# Patient Record
Sex: FEMALE | NOT HISPANIC OR LATINO | URBAN - METROPOLITAN AREA
[De-identification: names, ages, dates, MRNs, and addresses within clinical notes are randomized per-mention and may not be internally consistent; named-entity substitution may affect disease eponyms.]

---

## 2023-10-19 ENCOUNTER — LAB REQUISITION (OUTPATIENT)
Dept: LAB | Facility: CLINIC | Age: 38
End: 2023-10-19

## 2023-10-19 DIAGNOSIS — O02.1 MISSED ABORTION: ICD-10-CM

## 2023-10-19 LAB
ABO/RH(D): NORMAL
ANTIBODY SCREEN: NEGATIVE
HCG INTACT+B SERPL-ACNC: ABNORMAL MIU/ML
SPECIMEN EXPIRATION DATE: NORMAL

## 2023-10-19 PROCEDURE — 84702 CHORIONIC GONADOTROPIN TEST: CPT | Performed by: PHYSICIAN ASSISTANT

## 2023-10-19 PROCEDURE — 86901 BLOOD TYPING SEROLOGIC RH(D): CPT | Performed by: PHYSICIAN ASSISTANT

## 2023-10-21 ENCOUNTER — HEALTH MAINTENANCE LETTER (OUTPATIENT)
Age: 38
End: 2023-10-21

## 2023-10-24 ENCOUNTER — LAB REQUISITION (OUTPATIENT)
Dept: LAB | Facility: CLINIC | Age: 38
End: 2023-10-24

## 2023-10-24 DIAGNOSIS — O02.1 MISSED ABORTION: ICD-10-CM

## 2023-10-24 LAB — TSH SERPL DL<=0.005 MIU/L-ACNC: 0.83 UIU/ML (ref 0.3–4.2)

## 2023-10-24 PROCEDURE — 84443 ASSAY THYROID STIM HORMONE: CPT | Performed by: OBSTETRICS & GYNECOLOGY

## 2025-01-17 ENCOUNTER — LAB REQUISITION (OUTPATIENT)
Dept: LAB | Facility: CLINIC | Age: 40
End: 2025-01-17
Payer: COMMERCIAL

## 2025-01-17 DIAGNOSIS — Z31.9 ENCOUNTER FOR PROCREATIVE MANAGEMENT, UNSPECIFIED: ICD-10-CM

## 2025-01-17 LAB — MIS SERPL-MCNC: 2.24 NG/ML (ref 0.15–7.5)

## 2025-01-17 PROCEDURE — 82166 ASSAY ANTI-MULLERIAN HORM: CPT | Mod: ORL | Performed by: OBSTETRICS & GYNECOLOGY

## 2025-02-28 ENCOUNTER — MEDICAL CORRESPONDENCE (OUTPATIENT)
Dept: HEALTH INFORMATION MANAGEMENT | Facility: CLINIC | Age: 40
End: 2025-02-28
Payer: COMMERCIAL

## 2025-02-28 ENCOUNTER — TRANSFERRED RECORDS (OUTPATIENT)
Dept: HEALTH INFORMATION MANAGEMENT | Facility: CLINIC | Age: 40
End: 2025-02-28
Payer: COMMERCIAL

## 2025-02-28 ENCOUNTER — LAB REQUISITION (OUTPATIENT)
Dept: LAB | Facility: CLINIC | Age: 40
End: 2025-02-28

## 2025-02-28 DIAGNOSIS — Z32.01 ENCOUNTER FOR PREGNANCY TEST, RESULT POSITIVE: ICD-10-CM

## 2025-02-28 PROCEDURE — 87086 URINE CULTURE/COLONY COUNT: CPT | Performed by: NURSE PRACTITIONER

## 2025-03-01 LAB — BACTERIA UR CULT: NO GROWTH

## 2025-03-16 ENCOUNTER — HEALTH MAINTENANCE LETTER (OUTPATIENT)
Age: 40
End: 2025-03-16

## 2025-03-18 ENCOUNTER — LAB (OUTPATIENT)
Dept: LAB | Facility: HOSPITAL | Age: 40
End: 2025-03-18
Attending: OBSTETRICS & GYNECOLOGY
Payer: COMMERCIAL

## 2025-03-18 ENCOUNTER — OFFICE VISIT (OUTPATIENT)
Dept: MATERNAL FETAL MEDICINE | Facility: HOSPITAL | Age: 40
End: 2025-03-18
Attending: OBSTETRICS & GYNECOLOGY
Payer: COMMERCIAL

## 2025-03-18 DIAGNOSIS — O09.521 SUPERVISION OF ELDERLY MULTIGRAVIDA IN FIRST TRIMESTER: Primary | ICD-10-CM

## 2025-03-18 DIAGNOSIS — O26.90 PREGNANCY RELATED CONDITION, ANTEPARTUM: ICD-10-CM

## 2025-03-18 DIAGNOSIS — O09.521 SUPERVISION OF ELDERLY MULTIGRAVIDA IN FIRST TRIMESTER: ICD-10-CM

## 2025-03-18 PROCEDURE — 96041 GENETIC COUNSELING SVC EA 30: CPT | Performed by: GENETIC COUNSELOR, MS

## 2025-03-18 PROCEDURE — 36415 COLL VENOUS BLD VENIPUNCTURE: CPT

## 2025-03-18 NOTE — PROGRESS NOTES
[facility]  Genetic Counseling Consult    Patient:  Cinthia Casillas  Preferred Name: Cinthia YOB: 1985   Date of Service:  3/18/25   MRN: [id number]    Cinthia was seen at the [facility] [place] for genetic consultation. The indication for genetic counseling is advanced maternal age and desire to discuss options for genetic screening and diagnostics. The patient was unaccompanied to this visit.     The session was conducted in English.      IMPRESSION/ PLAN   1. Cinthia has not had genetic screening in this pregnancy but elected to have screening today.     2. During today's [facility] visit, Cinthia had a blood draw for expanded non-invasive prenatal testing (also called NIPT, NIPS, or cell-free DNA) through [facility] The expanded NIPT screens for trisomy 21, 18, and 13 and select microdeletion syndromes, including 22q11.2 deletion syndrome. The patient opted to screen for sex chromosome aneuploidies, including reported fetal sex. Results are expected in 7-14 days. The patient will be called with results and if they do not answer they requested a detailed message with results on their voicemail, NOT including the predicted fetal sex information. Instead, they would like the sex information mailed to their home. Patient was informed that results, including fetal sex, will be available in [facility].    3. Since the patient chose aneuploidy screening via NIPT, quad screen is NOT recommended in the second trimester. If the patient desires screening for open neural tube defects, maternal serum AFP only is recommended, ideally between 16-18 weeks gestation.    4. Cinthia did not have an ultrasound today but will be returning for a nuchal translucency ultrasound on 04/01/2025.    5. Further recommendations include a fetal anatomy level II ultrasound with [facility].     6. We discussed all genetic testing options including screening vs diagnostic  "testing. Cinthia does want a lot of information about the pregnancy but \"within reason\" and during our discussion identified what level of information would be most helpful. Cinthia did share the likelihood of considering termination of pregnancy for significant ultrasound concerns and/or positive screening or diagnostic testing results. We discussed the timeline of termination and how that fits with the comprehensive anatomy ultrasound and option for amniocentesis. At this time, Cinthia is planning to only do diagnostic testing if there is an indication.     PREGNANCY HISTORY   /Parity:       Cinthia's pregnancy history is significant for:   10/2023: 7w6d, SAB, trisomy 13    Trisomy 13    Trisomy 13 is a chromosomal disorder caused by an extra copy of chromosome 13. Trisomy 13 is also called Patau syndrome. The most common type of trisomy 13, is caused by nondisjunction ( 47, +13) and is associated with advanced maternal age. Trisomy 13 can also be caused by an unbalanced Robertsonian translocation. Finally, trisomy 13 can be mosaic and likely caused by mitotic nondisjunction.Trisomy 13 is associated with severe, multiple congenital anomalies with variable clinical presentation. Common associated abnormalities include those of the central nervous system, craniofacial, skin and limbs, cardiac, and genitalia. Prenatal sonographic findings of the central nervous system are common. The majority of trisomy 13 fetuses die in utero. The median survival for liveborn children is seven days. 91% of affected children will die within the first year.    Trisomy 13 is rare with a prevalence of 1 in 5000. However, studies on trisomy 13 are difficult due to the rarity and ascertainment bias of those pregnancy losses or affected children that did have chromosomal studies performed. The chance of nondisjunction resulting in a trisomy pregnancy increases as a woman age. Furthermore, for women with a previous trisomic pregnancy " "the chance of a subsequent pregnancy being affected with the same or different trisomy is increased (even when chromosome results are those of the sporadic nondisjunction type). For women under the age of 35 at previous trisomy 13, the risk of the same trisomy is 7.8 times greater and for a different trisomy 1.6 times greater. For women over the age of 35 at previous trisomy 13, the risk of the same trisomy is 2.2 times greater and for a different trisomy 0.9 times greater (Feliciano Reyes et al., 2009) It is important to remember that data for trisomy 13 is far less compared to trisomy 21 recurrence numbers.      Cinthia's result report from that pregnancy is not available for our review. If an anora test was done that would have determined the diagnosis of trisomy 13 but would not have provided mechanism. We discussed the family history does not cause concern for a translocation. Therefore, I think this history is unlikely to significantly increase risks for Cinthia.   CURRENT PREGNANCY   Current Age: 39 year old   Age at Delivery: 39 year old  ALEX: 10/09/2025                              Gestational Age: 10w5d  This pregnancy is a single gestation.   This pregnancy was conceived spontaneously.    MEDICAL HISTORY   Cinthia s reported medical history is not expected to impact pregnancy management or risks to fetal development.       FAMILY HISTORY   A three-generation pedigree was obtained today and is scanned under the \"Media\" tab in Epic. The family history was reported by Cinthia.    The following significant findings were reported today:   The father of the pregnancy, PAO Kearney, is healthy  The family history is negative for other trisomy conceptions, pregnancy losses, or infertility     Otherwise, the reported family history is unremarkable for multiple miscarriages, stillbirths, birth defects, intellectual disabilities, known genetic conditions, and consanguinity.       RISK ASSESSMENT FOR INHERITED CONDITIONS AND CARRIER " SCREENING OPTIONS   Expanded carrier screening is available to screen for autosomal recessive conditions and X-linked conditions in a large list of genes. Carrier screening does not test the pregnancy but gives a risk assessment for the pregnancy and future pregnancies to have the condition. Expanded carrier screening is designed to identify carrier status for conditions that are primarily childhood or adolescent onset. Expanded carrier screening does not evaluate for adult-onset conditions such as hereditary cancer syndromes, dementia/ Alzheimer's disease, or cardiovascular disease risk factors. Additionally, expanded carrier screening is not comprehensive for all known genetic diseases or inherited conditions. Carrier screening does not test for all genetic and health conditions or risk factors.     Autosomal recessive conditions happen when a mutation has been inherited from the egg and sperm and include conditions like cystic fibrosis, thalassemia, hearing loss, spinal muscular atrophy, and more. We reviewed that when both biological parents carry a harmful genetic change in a gene associated with autosomal recessive inheritance, each of their pregnancies has a 1 in 4 (25%) chance to be affected by that condition. X-linked conditions happen when a mutation has been inherited from the egg and include conditions like fragile X syndrome.With x-linked conditions, the specific risk generally depends on the chromosomal sex of the fetus, with XY individuals (generally male) being most severely affected.     [place] screening was reviewed. [place]  Screening    The patient does NOT have a family history of known inherited conditions. This does NOT mean the patient and/or their partner is not a carrier of a condition. Approximately 90% of couples at an increased reproductive risk for an inherited condition have no family history of that condition.     The patient has not had carrier screening previously.     The  patient was not certain about whether to pursue carrier screening today. They will contact us if they would like to pursue screening. See below for the more detailed information we discussed.    We discussed carrier screening can be done before or during a pregnancy. The purpose of carrier screening is providing an individual or couple with a personalized risk assessment for genetic conditions. This is accomplished by screening an individual to determine if they are a carrier for a genetic condition. For most conditions, both parents must be a carrier of the condition for the pregnancy to be at an increased risk. For other conditions that are X-linked, there is an increased risk when an individual with XX chromosomes (typically female) is a carrier and the concerns are greater if she passes that condition to a son (XY chromosomes).     Carrier screening is an option and a personal decision to pursue. If an individual or couple is interested or wishes to pursue carrier screening the following is discussed:  For most genetic conditions, carriers are healthy individuals. For autosomal recessive conditions (ex cystic fibrosis, sicklecell anemia, etc), individuals have two copies of each gene. Carrier individuals have a mutation in one copy. For X-linked conditions, females have two copies of each gene and are considered carriers if one copy has amutation. Males only have one copy of an X-linked gene, therefore, if that one copy has a mutation they are affected by the condition, rather than only being a carrier    For select conditions, carriers can have symptoms, however, the chance is variable. Examples of these conditions include:  Female premutation carriers of fragile X syndrome can have symptoms in adulthood including premature ovarian failure and ataxia. If a female passes the mutation to a son they are at a high risk for fragile X syndrome, which is themost common genetic cause for autism spectrum  disorder  Female or male carriers of Gaucher disease can have an increased chance for developing Parkinson's disease. Gaucher disease is a severe metabolic disorder.     If both parents are carriers of an autosomal recessive condition, there are three possible outcomes for each pregnancy/child: 25% unaffected, 50% carrier, and 25%affected. The only method to determine the outcome or diagnosis the condition in a pregnancy is an invasive testing option such as an amniocentesis. Some genetic conditions will have ultrasound findings during the pregnancybut many do not. Some couples will choose the diagnostic testing to plan for delivery or choose termination of an affected pregnancy. Other couples will choose to test for the condition after delivery. While these conditionscannot be cured or treated during the pregnancy it may guide management recommendations (ultrasounds) for pregnancy as well as delivery and infant care.     Carrier screening is not meant to diagnose the patient with a condition, and generally carriers are asymptomatic. However, certain genes may confer increased risks for various health concerns in carriers such as fragile X syndrome, Duchene muscular dystrophy, and Gaucher disease among others. Health risks for carriers could include increased risk of breast cancer, heart disease, premature ovarian failure, or Parkinson's disease. If the carrier screening results suggest a carrier health risk, the genetic counselor may recommended and coordinate a referral to a specialist. While carriers will have risks of developing health concerns, there is no certain guarantee. Some carriers may still never develop health concerns or symptoms related to their carrier status.    We discussed that expanded carrier screening is designed to identify carrier status for conditions that are primarily childhood or adolescent onset. Expanded carrier screening does not evaluate for adult-onset conditions such as hereditary  "cancer syndromes, dementia/ Alzheimer's disease, or cardiovascular disease risk factors. Additionally, expanded carrier screening is not comprehensive for all known genetic diseases or inherited conditions. This is a screening test, and residual carrier status risk figures will be provided to the patient after results become available.  We discussed there are limitations such as current technology and rare chance of a false positive or negative.     Results are typically available in 2-3 weeks.     Recommendations will be made for partner testing, if the patient is found to be a carrier for any autosomal recessive conditions. M will facilitate screening for the partner.     There are implications for family members. If an individual is a carrier of a condition there is a chance for relatives to also be a carrier. This may be helpful information to disclose to family (siblings, cousins) so they may choose if they want to pursue carriers screening. In addition, if only one parent is found to be a carrier, there is a 50% chance for each child to be a carrier. This may be helpful information for the patient's children when they start a family.    We reviewed that there is a law in place, the Genetic Information Nondiscrimination Act (NATHALIA), that protects patients from discrimination by health insurance companies and employers based on their genetic information. [place] does not protect against discrimination by life insurance companies or disability insurance.    Carrier screening is typically run through a lab called [facility]. [facility] is not responsible for this billing, it is handled entirely by [facility]. [facility] will contact the patient via email or text with an estimate. The patient has the responsibility of continuing with insurance billing or the option of changing to self pay ($249). Many plans \"cover\" genetic testing but that does not mean free. Covered benefits go towards a deductible or out of pocket " maximum (if a plan has these). If the patient decides the better financial option is to do self pay instead, it is their responsibility to follow the prompts with [email or web address] and change their billing. Genetic counselors have limited availability to change or help with this process. [email or web address] billing can also be reached at [phone] or [email or web address]    RISK ASSESSMENT FOR CHROMOSOME CONDITIONS   We explained that the risk for fetal chromosome abnormalities increases with maternal age. We discussed specific features of common chromosome abnormalities, including trisomy 21 (Down syndrome), trisomy 13, trisomy 18, and sex chromosome trisomies.    At age 39 at midtrimester, the risk to have a baby with Down syndrome is 1 in 98.   At age 39 at midtrimester, the risk to have a baby with any chromosome abnormality is 1 in 51.     Cinthia has not had genetic screening in this pregnancy but elected to have screening today.      GENETIC TESTING OPTIONS   Genetic testing during a pregnancy includes screening and diagnostic procedures.      Screening tests are non-invasive which means no risk to the pregnancy and includes ultrasounds and blood work. The benefits and limitations of screening were reviewed. Screening tests provide a risk assessment (chance) specific to the pregnancy for certain fetal chromosome abnormalities but cannot definitively diagnose or exclude a fetal chromosome abnormality. Follow-up genetic counseling and consideration of diagnostic testing is recommended with any abnormal screening result. Diagnostic testing during a pregnancy is more certain and can test for more conditions. However, the tests do have a risk of miscarriage that requires careful consideration. These tests can detect fetal chromosome abnormalities with greater than 99% certainty. Results can be compromised by maternal cell contamination or mosaicism and are limited by the resolution of current genetic testing technology.     There  is no screening or diagnostic test that detects all forms of birth defects or intellectual disability.     We discussed the following screening options:     Non-invasive prenatal testing (NIPT)  Also called cell-free DNA screening because it detects chromosomes from the placenta in the pregnant person's blood  Can be done any time after 10 weeks gestation  Standard recommendation for NIPT screens for trisomy 21, trisomy 18, trisomy 13, with the option of adding sex chromosome aneuploidies, without or without predicted sex  Cannot screen for open neural tube defects, maternal serum AFP after 15 weeks is recommended  New NIPT options include screening for other trisomies, microdeletion syndromes, and in some cases fetal blood antigens. Guidelines do not recommend these conditions are included in standard screening. These options have limitations and should be discussed with a genetic counselor.   However, current (2023) ACMG guidelines do recommend that screening for one microdeletion syndrome, called 22q11.2 deletion syndrome be offered to all pregnant patients. 22q11.2 deletion syndrome has an estimated prevalence of 1 in 990 to 1 in 2148 (0.05-0.1%). Risk is not thought to increase with maternal age. Clinical features are variable but include congenital heart defects, cleft palate, developmental delays, immune system deficiencies, and hearing loss. Approximately 90% of cases are de heavenly (a sporadic new change in a pregnancy). Cell-free DNA screening for 22q11.2 deletion syndrome is available with the inclusion of other microdeletion syndromes. There is less data about the performance of cell-free DNA screening for more rare microdeletions and the chance for false positives or negative may be increased.  We discussed the limitations of cell-free DNA screening in detecting microdeletions and the possibility of false positives and false negatives. The patient opted into microdeletion syndrome screening.     We  discussed the following ultrasound options:    Nuchal translucency (NT) ultrasound  Ultrasound between 38m2w-20z3d that includes nuchal translucency measurement and nasal bone assessments  Nuchal translucency refers to the space at the back of the neck where fluid builds up. All babies at this stage have fluid and there is only concern if there is too much fluid  Nasal bone refers to the small bone in the nose. There is concern for conditions like Down syndrome if the bone cannot be seen at all  This ultrasound can be done as part of first trimester screening, at the same time as another screen (NIPT), at the same time as a CVS, or if the patients does not want genetic screening.  Markers on ultrasound detects about 70% of pregnancies with aneuploidy  Abnormalities on NT ultrasound can also increase the risk for a birth defect, like a heart defect    Comprehensive level II ultrasound (Fetal Anatomy Ultrasound)  Ultrasound done between 18-20 weeks gestation  Screens for major birth defects and markers for aneuploidy (like trisomy 21 and trisomy 18)  Includes looking at the fetus/baby's growth, heart, organs (stomach, kidneys), placenta, and amniotic fluid    We discussed the following diagnostic options:     Chorionic villus sampling (CVS)  Invasive diagnostic procedure done between 10w0d and 13w6d  The procedure collects a small sample from the placenta for the purpose of chromosomal testing and/or other genetic testing  Diagnostic result; more than 99% sensitivity for fetal chromosome abnormalities  Cannot screen for open neural tube defects, maternal serum AFP after 15 weeks is recommended    Amniocentesis  Invasive diagnostic procedure done after 15 weeks gestation  The procedure collects a small sample of amniotic fluid for the purpose of chromosomal testing and/or other genetic testing  Diagnostic result; more than 99% sensitivity for fetal chromosome abnormalities  Testing for AFP in the amniotic fluid can  test for open neural tube defects    It was a pleasure to be involved with Cinthia s care. I spent 45 minutes on the date of the encounter doing chart review, obtaining history, test coordination, documentation, and further activities as noted above.    Danika Dela Cruz GC, MS, [facility]  Board Certified and [place] Licensed Genetic Counselor  [facility]  Maternal Fetal Medicine  Office: [phone]  [facility]: [phone]   Fax: [phone]  [facility]

## 2025-03-24 ENCOUNTER — TELEPHONE (OUTPATIENT)
Dept: MATERNAL FETAL MEDICINE | Facility: HOSPITAL | Age: 40
End: 2025-03-24
Payer: COMMERCIAL

## 2025-03-24 LAB — SCANNED LAB RESULT: NORMAL

## 2025-03-24 NOTE — TELEPHONE ENCOUNTER
Called Cinthia and left mail per patient's wishes to disclose their normal NIPT result.      The results are negative and consistent with two copies of chromosomes 21, 18, and 13.     Sex chromosome analysis was also included and the result is negative which is consistent with two copies of sex chromosomes. The predicted fetal sex was reported on the result. However, the patient wished to not know the sex at this time. They would like an envelope revealing the sex mailed to their home.    The results are also negative and consistent with no microdeletion in 22q11.21, 15q11.2,5p,4p, and 1p36 region.    This puts her current pregnancy at low risk for Down syndrome, trisomy 18, trisomy 13 and sex chromosome abnormalities. This test is reported to have a greater than 99% sensitivity for trisomy 21, trisomy 18, and trisomy 13, and monosomy X. Sensitivity data is not available for individual sex chromosome aneuploidies and microdeletion syndromes (if included). Although these results are reassuring, this does not replace a standard chromosome analysis from a chorionic villus sampling or amniocentesis.     The patient's nuchal translucency with MFM has been scheduled on 04/01/25.    MSAFP is the appropriate second trimester screening test for open neural tube defects; the maternal quad screen is not recommended.    Her results will be faxed to her primary OB provider to review.     Danika Dela Cruz MS, [facility]  Licensed Genetic Counselor   [facility]  Maternal Fetal Medicine  [email or web address]  [email or web address]  Office: [phone]  Pager [phone]  [facility]: [phone]   Fax: [phone]

## 2025-03-28 ENCOUNTER — TRANSFERRED RECORDS (OUTPATIENT)
Dept: HEALTH INFORMATION MANAGEMENT | Facility: CLINIC | Age: 40
End: 2025-03-28
Payer: COMMERCIAL

## 2025-04-01 ENCOUNTER — ANCILLARY PROCEDURE (OUTPATIENT)
Dept: ULTRASOUND IMAGING | Facility: HOSPITAL | Age: 40
End: 2025-04-01
Attending: STUDENT IN AN ORGANIZED HEALTH CARE EDUCATION/TRAINING PROGRAM
Payer: COMMERCIAL

## 2025-04-01 ENCOUNTER — OFFICE VISIT (OUTPATIENT)
Dept: MATERNAL FETAL MEDICINE | Facility: HOSPITAL | Age: 40
End: 2025-04-01
Attending: STUDENT IN AN ORGANIZED HEALTH CARE EDUCATION/TRAINING PROGRAM
Payer: COMMERCIAL

## 2025-04-01 DIAGNOSIS — O26.90 PREGNANCY RELATED CONDITION, ANTEPARTUM: ICD-10-CM

## 2025-04-01 DIAGNOSIS — O09.522 MULTIGRAVIDA OF ADVANCED MATERNAL AGE IN SECOND TRIMESTER: Primary | ICD-10-CM

## 2025-04-01 PROCEDURE — 76801 OB US < 14 WKS SINGLE FETUS: CPT | Mod: 26 | Performed by: STUDENT IN AN ORGANIZED HEALTH CARE EDUCATION/TRAINING PROGRAM

## 2025-04-01 PROCEDURE — 99207 PR NO CHARGE LOS: CPT | Performed by: STUDENT IN AN ORGANIZED HEALTH CARE EDUCATION/TRAINING PROGRAM

## 2025-04-01 PROCEDURE — 76801 OB US < 14 WKS SINGLE FETUS: CPT

## 2025-04-01 NOTE — NURSING NOTE
Cinthia Casillas is a  at 12w5d who presents to [facility] for an NT ultrasound.  Pt denies bldg/lof/change in discharge, contractions, headache, vision changes, chest pain/SOB or edema. SBAR given to Dr. SHAQ Gallardo, see note in Epic.      Juanita Connor RN

## 2025-04-01 NOTE — PROGRESS NOTES
The patient was seen for an ultrasound in the [facility] today.  For a detailed report of the ultrasound examination, please see the ultrasound report which can be found under the imaging tab.     If you have questions regarding today's evaluation or if we can be of further service, please contact the Maternal-Fetal Medicine Center.    Shaista Gallardo MD  Maternal Fetal Medicine

## 2025-04-02 ENCOUNTER — LAB REQUISITION (OUTPATIENT)
Dept: LAB | Facility: CLINIC | Age: 40
End: 2025-04-02

## 2025-04-02 DIAGNOSIS — Z11.3 ENCOUNTER FOR SCREENING FOR INFECTIONS WITH A PREDOMINANTLY SEXUAL MODE OF TRANSMISSION: ICD-10-CM

## 2025-04-02 DIAGNOSIS — Z34.90 ENCOUNTER FOR SUPERVISION OF NORMAL PREGNANCY, UNSPECIFIED, UNSPECIFIED TRIMESTER: ICD-10-CM

## 2025-04-02 LAB
ABO + RH BLD: NORMAL
BASOPHILS # BLD AUTO: 0 10E3/UL (ref 0–0.2)
BASOPHILS NFR BLD AUTO: 0 %
BLD GP AB SCN SERPL QL: NEGATIVE
EOSINOPHIL # BLD AUTO: 0.1 10E3/UL (ref 0–0.7)
EOSINOPHIL NFR BLD AUTO: 1 %
ERYTHROCYTE [DISTWIDTH] IN BLOOD BY AUTOMATED COUNT: 12.6 % (ref 10–15)
EST. AVERAGE GLUCOSE BLD GHB EST-MCNC: 103 MG/DL
HBA1C MFR BLD: 5.2 %
HBV SURFACE AG SERPL QL IA: NONREACTIVE
HCT VFR BLD AUTO: 38.8 % (ref 35–47)
HCV AB SERPL QL IA: NONREACTIVE
HGB BLD-MCNC: 13.5 G/DL (ref 11.7–15.7)
HIV 1+2 AB+HIV1 P24 AG SERPL QL IA: NONREACTIVE
IMM GRANULOCYTES # BLD: 0 10E3/UL
IMM GRANULOCYTES NFR BLD: 0 %
LYMPHOCYTES # BLD AUTO: 1.3 10E3/UL (ref 0.8–5.3)
LYMPHOCYTES NFR BLD AUTO: 14 %
MCH RBC QN AUTO: 31 PG (ref 26.5–33)
MCHC RBC AUTO-ENTMCNC: 34.8 G/DL (ref 31.5–36.5)
MCV RBC AUTO: 89 FL (ref 78–100)
MONOCYTES # BLD AUTO: 0.5 10E3/UL (ref 0–1.3)
MONOCYTES NFR BLD AUTO: 5 %
NEUTROPHILS # BLD AUTO: 7.3 10E3/UL (ref 1.6–8.3)
NEUTROPHILS NFR BLD AUTO: 80 %
NRBC # BLD AUTO: 0 10E3/UL
NRBC BLD AUTO-RTO: 0 /100
PLATELET # BLD AUTO: 196 10E3/UL (ref 150–450)
RBC # BLD AUTO: 4.35 10E6/UL (ref 3.8–5.2)
SPECIMEN EXP DATE BLD: NORMAL
WBC # BLD AUTO: 9.2 10E3/UL (ref 4–11)

## 2025-04-02 PROCEDURE — 86803 HEPATITIS C AB TEST: CPT | Performed by: OBSTETRICS & GYNECOLOGY

## 2025-04-02 PROCEDURE — 83036 HEMOGLOBIN GLYCOSYLATED A1C: CPT | Performed by: OBSTETRICS & GYNECOLOGY

## 2025-04-02 PROCEDURE — 87491 CHLMYD TRACH DNA AMP PROBE: CPT | Performed by: OBSTETRICS & GYNECOLOGY

## 2025-04-02 PROCEDURE — 87340 HEPATITIS B SURFACE AG IA: CPT | Performed by: OBSTETRICS & GYNECOLOGY

## 2025-04-02 PROCEDURE — 87389 HIV-1 AG W/HIV-1&-2 AB AG IA: CPT | Performed by: OBSTETRICS & GYNECOLOGY

## 2025-04-02 PROCEDURE — 86900 BLOOD TYPING SEROLOGIC ABO: CPT | Performed by: OBSTETRICS & GYNECOLOGY

## 2025-04-02 PROCEDURE — 85025 COMPLETE CBC W/AUTO DIFF WBC: CPT | Performed by: OBSTETRICS & GYNECOLOGY

## 2025-04-02 PROCEDURE — 87591 N.GONORRHOEAE DNA AMP PROB: CPT | Performed by: OBSTETRICS & GYNECOLOGY

## 2025-04-02 PROCEDURE — 86762 RUBELLA ANTIBODY: CPT | Performed by: OBSTETRICS & GYNECOLOGY

## 2025-04-02 PROCEDURE — 86850 RBC ANTIBODY SCREEN: CPT | Performed by: OBSTETRICS & GYNECOLOGY

## 2025-04-02 PROCEDURE — 85014 HEMATOCRIT: CPT | Performed by: OBSTETRICS & GYNECOLOGY

## 2025-04-03 LAB
C TRACH DNA SPEC QL PROBE+SIG AMP: NEGATIVE
N GONORRHOEA DNA SPEC QL NAA+PROBE: NEGATIVE
RPR SER QL: NONREACTIVE
RUBV IGG SERPL QL IA: 13.8 INDEX
RUBV IGG SERPL QL IA: POSITIVE
SPECIMEN TYPE: NORMAL

## 2025-04-29 ENCOUNTER — LAB REQUISITION (OUTPATIENT)
Dept: LAB | Facility: CLINIC | Age: 40
End: 2025-04-29
Payer: COMMERCIAL

## 2025-04-29 DIAGNOSIS — Z3A.16 16 WEEKS GESTATION OF PREGNANCY: ICD-10-CM

## 2025-04-29 PROCEDURE — 82105 ALPHA-FETOPROTEIN SERUM: CPT | Performed by: NURSE PRACTITIONER

## 2025-05-01 LAB
# FETUSES US: NORMAL
AFP MOM SERPL: 1.78
AFP SERPL-MCNC: 63 NG/ML
AGE - REPORTED: 39.9 YR
CURRENT SMOKER: NO
FAMILY MEMBER DISEASES HX: NORMAL
GA METHOD: NORMAL
GA: NORMAL WK
IDDM PATIENT QL: NO
INTEGRATED SCN PATIENT-IMP: NORMAL
SPECIMEN DRAWN SERPL: NORMAL

## 2025-05-02 ENCOUNTER — TRANSFERRED RECORDS (OUTPATIENT)
Dept: HEALTH INFORMATION MANAGEMENT | Facility: CLINIC | Age: 40
End: 2025-05-02
Payer: COMMERCIAL

## 2025-05-16 ENCOUNTER — ANCILLARY PROCEDURE (OUTPATIENT)
Dept: ULTRASOUND IMAGING | Facility: HOSPITAL | Age: 40
End: 2025-05-16
Attending: STUDENT IN AN ORGANIZED HEALTH CARE EDUCATION/TRAINING PROGRAM
Payer: COMMERCIAL

## 2025-05-16 DIAGNOSIS — O09.522 MULTIGRAVIDA OF ADVANCED MATERNAL AGE IN SECOND TRIMESTER: ICD-10-CM

## 2025-05-16 PROCEDURE — 99213 OFFICE O/P EST LOW 20 MIN: CPT | Mod: 25 | Performed by: STUDENT IN AN ORGANIZED HEALTH CARE EDUCATION/TRAINING PROGRAM

## 2025-05-16 PROCEDURE — 76817 TRANSVAGINAL US OBSTETRIC: CPT | Mod: 26 | Performed by: STUDENT IN AN ORGANIZED HEALTH CARE EDUCATION/TRAINING PROGRAM

## 2025-05-16 PROCEDURE — 76817 TRANSVAGINAL US OBSTETRIC: CPT

## 2025-05-16 PROCEDURE — 76811 OB US DETAILED SNGL FETUS: CPT | Mod: 26 | Performed by: STUDENT IN AN ORGANIZED HEALTH CARE EDUCATION/TRAINING PROGRAM

## 2025-05-23 ENCOUNTER — HOSPITAL ENCOUNTER (OUTPATIENT)
Dept: ULTRASOUND IMAGING | Facility: CLINIC | Age: 40
Discharge: HOME | End: 2025-05-23
Attending: STUDENT IN AN ORGANIZED HEALTH CARE EDUCATION/TRAINING PROGRAM
Payer: COMMERCIAL

## 2025-05-23 DIAGNOSIS — O26.879 SHORT CERVIX AFFECTING PREGNANCY: ICD-10-CM

## 2025-05-23 PROCEDURE — 76817 TRANSVAGINAL US OBSTETRIC: CPT

## 2025-05-27 ENCOUNTER — HOSPITAL ENCOUNTER (OUTPATIENT)
Facility: CLINIC | Age: 40
Discharge: HOME | End: 2025-05-28
Attending: OBSTETRICS & GYNECOLOGY | Admitting: OBSTETRICS & GYNECOLOGY
Payer: COMMERCIAL

## 2025-05-27 ENCOUNTER — ANESTHESIA EVENT (OUTPATIENT)
Dept: OBGYN | Facility: CLINIC | Age: 40
End: 2025-05-27
Payer: COMMERCIAL

## 2025-05-27 ENCOUNTER — ANESTHESIA (OUTPATIENT)
Dept: OBGYN | Facility: CLINIC | Age: 40
End: 2025-05-27
Payer: COMMERCIAL

## 2025-05-27 ENCOUNTER — HOSPITAL ENCOUNTER (OUTPATIENT)
Dept: ULTRASOUND IMAGING | Facility: CLINIC | Age: 40
Discharge: HOME | End: 2025-05-27
Attending: OBSTETRICS & GYNECOLOGY
Payer: COMMERCIAL

## 2025-05-27 ENCOUNTER — OFFICE VISIT (OUTPATIENT)
Dept: MATERNAL FETAL MEDICINE | Facility: CLINIC | Age: 40
End: 2025-05-27
Attending: OBSTETRICS & GYNECOLOGY
Payer: COMMERCIAL

## 2025-05-27 DIAGNOSIS — O26.872 SHORT CERVIX DURING PREGNANCY IN SECOND TRIMESTER: Primary | ICD-10-CM

## 2025-05-27 DIAGNOSIS — O26.879 SHORT CERVIX AFFECTING PREGNANCY: ICD-10-CM

## 2025-05-27 DIAGNOSIS — B96.89 BACTERIAL VAGINOSIS IN PREGNANCY: ICD-10-CM

## 2025-05-27 DIAGNOSIS — O34.31 CERVICAL INSUFFICIENCY DURING PREGNANCY IN FIRST TRIMESTER, ANTEPARTUM: Primary | ICD-10-CM

## 2025-05-27 DIAGNOSIS — O23.599 BACTERIAL VAGINOSIS IN PREGNANCY: ICD-10-CM

## 2025-05-27 LAB
ABO + RH BLD: NORMAL
ALBUMIN UR-MCNC: NEGATIVE MG/DL
APPEARANCE UR: ABNORMAL
BILIRUB UR QL STRIP: NEGATIVE
BLD GP AB SCN SERPL QL: NEGATIVE
CLUE CELLS: PRESENT
COLOR UR AUTO: YELLOW
ERYTHROCYTE [DISTWIDTH] IN BLOOD BY AUTOMATED COUNT: 12.2 % (ref 10–15)
GLUCOSE UR STRIP-MCNC: NEGATIVE MG/DL
HCT VFR BLD AUTO: 36.4 % (ref 35–47)
HGB BLD-MCNC: 13.1 G/DL (ref 11.7–15.7)
HGB UR QL STRIP: NEGATIVE
KETONES UR STRIP-MCNC: 80 MG/DL
LEUKOCYTE ESTERASE UR QL STRIP: ABNORMAL
MCH RBC QN AUTO: 31.6 PG (ref 26.5–33)
MCHC RBC AUTO-ENTMCNC: 36 G/DL (ref 31.5–36.5)
MCV RBC AUTO: 88 FL (ref 78–100)
MUCOUS THREADS #/AREA URNS LPF: PRESENT /LPF
NITRATE UR QL: NEGATIVE
PH UR STRIP: 7 [PH] (ref 5–7)
PLATELET # BLD AUTO: 177 10E3/UL (ref 150–450)
RBC # BLD AUTO: 4.14 10E6/UL (ref 3.8–5.2)
RBC URINE: 14 /HPF
SP GR UR STRIP: 1.01 (ref 1–1.03)
SPECIMEN EXP DATE BLD: NORMAL
SQUAMOUS EPITHELIAL: <1 /HPF
TRICHOMONAS, WET PREP: ABNORMAL
UROBILINOGEN UR STRIP-MCNC: NORMAL MG/DL
WBC # BLD AUTO: 11.8 10E3/UL (ref 4–11)
WBC URINE: <1 /HPF
WBC'S/HIGH POWER FIELD, WET PREP: ABNORMAL
YEAST, WET PREP: ABNORMAL

## 2025-05-27 PROCEDURE — 250N000013 HC RX MED GY IP 250 OP 250 PS 637

## 2025-05-27 PROCEDURE — 258N000003 HC RX IP 258 OP 636: Performed by: OBSTETRICS & GYNECOLOGY

## 2025-05-27 PROCEDURE — 59320 REVISION OF CERVIX: CPT | Mod: GC | Performed by: OBSTETRICS & GYNECOLOGY

## 2025-05-27 PROCEDURE — 258N000003 HC RX IP 258 OP 636

## 2025-05-27 PROCEDURE — 76817 TRANSVAGINAL US OBSTETRIC: CPT | Mod: 26 | Performed by: OBSTETRICS & GYNECOLOGY

## 2025-05-27 PROCEDURE — 76817 TRANSVAGINAL US OBSTETRIC: CPT

## 2025-05-27 PROCEDURE — 99215 OFFICE O/P EST HI 40 MIN: CPT | Mod: 25 | Performed by: OBSTETRICS & GYNECOLOGY

## 2025-05-27 PROCEDURE — 86901 BLOOD TYPING SEROLOGIC RH(D): CPT

## 2025-05-27 PROCEDURE — 85027 COMPLETE CBC AUTOMATED: CPT

## 2025-05-27 PROCEDURE — 250N000011 HC RX IP 250 OP 636: Mod: JW | Performed by: ANESTHESIOLOGY

## 2025-05-27 PROCEDURE — 710N000010 HC RECOVERY PHASE 1, LEVEL 2, PER MIN: Performed by: OBSTETRICS & GYNECOLOGY

## 2025-05-27 PROCEDURE — 87491 CHLMYD TRACH DNA AMP PROBE: CPT

## 2025-05-27 PROCEDURE — 250N000011 HC RX IP 250 OP 636

## 2025-05-27 PROCEDURE — 81001 URINALYSIS AUTO W/SCOPE: CPT

## 2025-05-27 PROCEDURE — 250N000011 HC RX IP 250 OP 636: Performed by: OBSTETRICS & GYNECOLOGY

## 2025-05-27 PROCEDURE — 272N000001 HC OR GENERAL SUPPLY STERILE: Performed by: OBSTETRICS & GYNECOLOGY

## 2025-05-27 PROCEDURE — 99417 PROLNG OP E/M EACH 15 MIN: CPT | Mod: 25 | Performed by: OBSTETRICS & GYNECOLOGY

## 2025-05-27 PROCEDURE — 370N000017 HC ANESTHESIA TECHNICAL FEE, PER MIN: Performed by: OBSTETRICS & GYNECOLOGY

## 2025-05-27 PROCEDURE — 360N000074 HC SURGERY LEVEL 1, PER MIN: Performed by: OBSTETRICS & GYNECOLOGY

## 2025-05-27 PROCEDURE — 87210 SMEAR WET MOUNT SALINE/INK: CPT

## 2025-05-27 RX ORDER — SODIUM CHLORIDE, SODIUM LACTATE, POTASSIUM CHLORIDE, CALCIUM CHLORIDE 600; 310; 30; 20 MG/100ML; MG/100ML; MG/100ML; MG/100ML
INJECTION, SOLUTION INTRAVENOUS CONTINUOUS
Status: DISCONTINUED | OUTPATIENT
Start: 2025-05-27 | End: 2025-05-27 | Stop reason: HOSPADM

## 2025-05-27 RX ORDER — NALOXONE HYDROCHLORIDE 0.4 MG/ML
0.1 INJECTION, SOLUTION INTRAMUSCULAR; INTRAVENOUS; SUBCUTANEOUS
Status: DISCONTINUED | OUTPATIENT
Start: 2025-05-27 | End: 2025-05-27 | Stop reason: HOSPADM

## 2025-05-27 RX ORDER — ONDANSETRON 2 MG/ML
INJECTION INTRAMUSCULAR; INTRAVENOUS PRN
Status: DISCONTINUED | OUTPATIENT
Start: 2025-05-27 | End: 2025-05-27

## 2025-05-27 RX ORDER — INDOMETHACIN 25 MG/1
50 CAPSULE ORAL EVERY 8 HOURS
Status: COMPLETED | OUTPATIENT
Start: 2025-05-27 | End: 2025-05-28

## 2025-05-27 RX ORDER — ONDANSETRON 4 MG/1
4 TABLET, ORALLY DISINTEGRATING ORAL
Status: DISCONTINUED | OUTPATIENT
Start: 2025-05-27 | End: 2025-05-28 | Stop reason: HOSPADM

## 2025-05-27 RX ORDER — BUPIVACAINE HYDROCHLORIDE 7.5 MG/ML
INJECTION, SOLUTION INTRASPINAL
Status: COMPLETED | OUTPATIENT
Start: 2025-05-27 | End: 2025-05-27

## 2025-05-27 RX ORDER — ONDANSETRON 4 MG/1
4 TABLET, ORALLY DISINTEGRATING ORAL EVERY 30 MIN PRN
Status: DISCONTINUED | OUTPATIENT
Start: 2025-05-27 | End: 2025-05-27 | Stop reason: HOSPADM

## 2025-05-27 RX ORDER — CEFAZOLIN SODIUM 2 G/50ML
2 SOLUTION INTRAVENOUS
Status: COMPLETED | OUTPATIENT
Start: 2025-05-27 | End: 2025-05-27

## 2025-05-27 RX ORDER — OMEPRAZOLE 40 MG/1
40 CAPSULE, DELAYED RELEASE ORAL DAILY
COMMUNITY

## 2025-05-27 RX ORDER — ACETAMINOPHEN 325 MG/1
650 TABLET ORAL
Status: DISCONTINUED | OUTPATIENT
Start: 2025-05-27 | End: 2025-05-28 | Stop reason: HOSPADM

## 2025-05-27 RX ORDER — CEFAZOLIN SODIUM 1 G/3ML
1 INJECTION, POWDER, FOR SOLUTION INTRAMUSCULAR; INTRAVENOUS EVERY 8 HOURS
Status: CANCELLED | OUTPATIENT
Start: 2025-05-27 | End: 2025-05-27

## 2025-05-27 RX ORDER — DEXAMETHASONE SODIUM PHOSPHATE 4 MG/ML
4 INJECTION, SOLUTION INTRA-ARTICULAR; INTRALESIONAL; INTRAMUSCULAR; INTRAVENOUS; SOFT TISSUE
Status: DISCONTINUED | OUTPATIENT
Start: 2025-05-27 | End: 2025-05-27 | Stop reason: HOSPADM

## 2025-05-27 RX ORDER — LIDOCAINE 40 MG/G
CREAM TOPICAL
Status: DISCONTINUED | OUTPATIENT
Start: 2025-05-27 | End: 2025-05-27 | Stop reason: HOSPADM

## 2025-05-27 RX ORDER — CEFAZOLIN SODIUM 2 G/50ML
2 SOLUTION INTRAVENOUS EVERY 8 HOURS
Status: COMPLETED | OUTPATIENT
Start: 2025-05-28 | End: 2025-05-28

## 2025-05-27 RX ORDER — VITAMIN A, VITAMIN C, VITAMIN D-3, VITAMIN E, VITAMIN B-1, VITAMIN B-2, NIACIN, VITAMIN B-6, CALCIUM, IRON, ZINC, COPPER 4000; 120; 400; 22; 1.84; 3; 20; 10; 1; 12; 200; 27; 25; 2 [IU]/1; MG/1; [IU]/1; MG/1; MG/1; MG/1; MG/1; MG/1; MG/1; UG/1; MG/1; MG/1; MG/1; MG/1
TABLET ORAL DAILY
COMMUNITY

## 2025-05-27 RX ORDER — SODIUM CHLORIDE, SODIUM LACTATE, POTASSIUM CHLORIDE, CALCIUM CHLORIDE 600; 310; 30; 20 MG/100ML; MG/100ML; MG/100ML; MG/100ML
INJECTION, SOLUTION INTRAVENOUS CONTINUOUS PRN
Status: DISCONTINUED | OUTPATIENT
Start: 2025-05-27 | End: 2025-05-27

## 2025-05-27 RX ORDER — ONDANSETRON 2 MG/ML
4 INJECTION INTRAMUSCULAR; INTRAVENOUS EVERY 30 MIN PRN
Status: DISCONTINUED | OUTPATIENT
Start: 2025-05-27 | End: 2025-05-27 | Stop reason: HOSPADM

## 2025-05-27 RX ORDER — CITRIC ACID/SODIUM CITRATE 334-500MG
30 SOLUTION, ORAL ORAL ONCE
Status: COMPLETED | OUTPATIENT
Start: 2025-05-27 | End: 2025-05-27

## 2025-05-27 RX ORDER — FAMOTIDINE 10 MG
20 TABLET ORAL 2 TIMES DAILY
Status: CANCELLED | OUTPATIENT
Start: 2025-05-27

## 2025-05-27 RX ADMIN — PHENYLEPHRINE HYDROCHLORIDE 25 MCG/MIN: 10 INJECTION INTRAVENOUS at 20:46

## 2025-05-27 RX ADMIN — BUPIVACAINE HYDROCHLORIDE IN DEXTROSE 1.5 ML: 7.5 INJECTION, SOLUTION SUBARACHNOID at 20:38

## 2025-05-27 RX ADMIN — SODIUM CHLORIDE, SODIUM LACTATE, POTASSIUM CHLORIDE, AND CALCIUM CHLORIDE: .6; .31; .03; .02 INJECTION, SOLUTION INTRAVENOUS at 20:28

## 2025-05-27 RX ADMIN — SODIUM CHLORIDE, SODIUM LACTATE, POTASSIUM CHLORIDE, AND CALCIUM CHLORIDE 1000 ML: .6; .31; .03; .02 INJECTION, SOLUTION INTRAVENOUS at 16:49

## 2025-05-27 RX ADMIN — SODIUM CHLORIDE, SODIUM LACTATE, POTASSIUM CHLORIDE, AND CALCIUM CHLORIDE: .6; .31; .03; .02 INJECTION, SOLUTION INTRAVENOUS at 17:44

## 2025-05-27 RX ADMIN — SODIUM CITRATE AND CITRIC ACID MONOHYDRATE 30 ML: 500; 334 SOLUTION ORAL at 20:24

## 2025-05-27 RX ADMIN — ONDANSETRON 4 MG: 2 INJECTION INTRAMUSCULAR; INTRAVENOUS at 20:46

## 2025-05-27 RX ADMIN — CEFAZOLIN SODIUM 2 G: 2 SOLUTION INTRAVENOUS at 20:46

## 2025-05-27 RX ADMIN — INDOMETHACIN 50 MG: 25 CAPSULE ORAL at 23:12

## 2025-05-27 RX ADMIN — SODIUM CHLORIDE, SODIUM LACTATE, POTASSIUM CHLORIDE, AND CALCIUM CHLORIDE: .6; .31; .03; .02 INJECTION, SOLUTION INTRAVENOUS at 16:16

## 2025-05-27 ASSESSMENT — ACTIVITIES OF DAILY LIVING (ADL)
ADLS_ACUITY_SCORE: 15

## 2025-05-27 NOTE — PLAN OF CARE
Goal Outcome Evaluation:    Urine sent. IV placed. 1 Liter bolus given. Pt educated to keep an empty bladder. Reports feeling occasional contraction. Plan is for a cerclage this maria de jesus. Consents done.

## 2025-05-27 NOTE — PROVIDER NOTIFICATION
05/27/25 1512   Provider Notification   Provider Name/Title Dr Acuna   Method of Notification Electronic Page   Request Evaluate in Person   Notification Reason Patient Arrived     Pt arrived from clinic for cerclage.

## 2025-05-27 NOTE — H&P
Antepartum History and Physical     May 27, 2025  Cinthia Casillas  [id number]      HPI     Cinthia Casillas is a 39 year old  at 20w5d by LMP confirmed with 8 week US who presents from clinic with 8mm short cervix and evaluation for cerclage placement.    Cinthia has been seen in [facility] clinic was weekly cervical length assessments due to a measurement of 29.4mm on . On , ultrasound showed cervical shortening of 16.2mm and therefore she was prescribed prometrium 200mg at this time with follow up planned for today. At today's ultrasound, cervical length was 8mm therefore she was sent to [facility] for evaluation for placement of cerclage. Cinthia is an otherwise healthy person. She states that she is overall feeling well today. She has been feeling rare tightening of her abdomen since being placed on the monitor. She denies vaginal bleeding or loss of fluid and is feeling normal fetal movement.     Her pregnancy has been complicated by:  - Elderly primigravida  - RhD negative  - Shortened cervical length     OB History    Para Term  AB Living   2 0 0 0 1 0   SAB IAB Ectopic Multiple Live Births   1 0 0 0 0      # Outcome Date GA Lbr Cali/2nd Weight Sex Type Anes PTL Lv   2 Current            1 SAB 10/19/23 7w6d    SAB         Birth Comments: Trisomy 13       Past Medical History   No past medical history on file.    Past Surgical History     Past Surgical History:   Procedure Laterality Date    DILATION AND CURETTAGE         Medications     Current Facility-Administered Medications   Medication Dose Route Frequency Provider Last Rate Last Admin    lactated ringers infusion   Intravenous Continuous Merle Watkins  mL/hr at 25 1616 New Bag at 25 1616    lidocaine (LMX4) cream   Topical Q1H PRN Merle Watkins MD        lidocaine 1 % 0.1-1 mL  0.1-1 mL Other Q1H PRN Merle Watkins MD        sodium chloride (PF) 0.9% PF flush 3 mL  3 mL Intracatheter Q8H  "[facility] Merle Watkins MD        sodium chloride (PF) 0.9% PF flush 3 mL  3 mL Intracatheter q1 min prn Merle Watkins MD        sodium citrate-citric acid (BICITRA) solution 30 mL  30 mL Oral Once Merle Watkins MD           Allergies   No Known Allergies    Family History   No family history on file.    Social History     Social History     Socioeconomic History    Marital status:      Spouse name: None    Number of children: None    Years of education: None    Highest education level: None     Social Drivers of Health     Financial Resource Strain: Low Risk  (5/27/2025)    Financial Resource Strain     Within the past 12 months, have you or your family members you live with been unable to get utilities (heat, electricity) when it was really needed?: No   Food Insecurity: Low Risk  (5/27/2025)    Food Insecurity     Within the past 12 months, did you worry that your food would run out before you got money to buy more?: No     Within the past 12 months, did the food you bought just not last and you didn t have money to get more?: No   Transportation Needs: Low Risk  (5/27/2025)    Transportation Needs     Within the past 12 months, has lack of transportation kept you from medical appointments, getting your medicines, non-medical meetings or appointments, work, or from getting things that you need?: No    Received from [facility]    Social Connections   Housing Stability: Low Risk  (5/27/2025)    Housing Stability     Do you have housing? : Yes     Are you worried about losing your housing?: No       ROS   10-point ROS negative except as indicated in HPI.    Physical Exam     Vitals:    05/27/25 1507   Weight: 74.8 kg (165 lb)   Height: 1.702 m (5' 7\")     General: alert, oriented female, resting in bed in NAD  CV: regular rate and rhythm, normal s1 and s2, no murmurs  Lungs: clear bilaterally, no crackles or wheezes  Abdomen: soft, gravid, " "non-tender  Extremities: bilateral lower extremities non-tender with no edema    FHT: Dopp tones +  [place]: Initially irritability noted q 3-5 minutes, spontaneous resolved.     Labs     Lab Results   Component Value Date    AS Negative 2025    HEPBANG Nonreactive 2025    HGB 13.5 2025       GBS Status:   No results found for: \"GBS\"    No results found for: \"PAP\"    Results for orders placed or performed during the hospital encounter of 25 (from the past 24 hours)   MFM US OB Transvaginal    Narrative            Cx TV  ---------------------------------------------------------------------------------------------------------  Pat. Name: LIU HAYES       Study Date:  2025 1:31p  Pat. NO:  [id number]        Referring  MD: STARR ISAACS  Site:         Sonographer: Farheen Felton RDMS  :  [id number]        Age:   39  ---------------------------------------------------------------------------------------------------------    INDICATION  ---------------------------------------------------------------------------------------------------------  Short cervix on vaginal progesterone      METHOD  ---------------------------------------------------------------------------------------------------------  Transabdominal and transvaginal ultrasound examination. View: Sufficient      PREGNANCY  ---------------------------------------------------------------------------------------------------------  Darling pregnancy. Number of fetuses: 1      DATING  ---------------------------------------------------------------------------------------------------------                                           Date                                Details                                                                                      Gest. age                      ALEX  LMP                                  2025                                                                                                     "                       20 w + 5 d                     10/9/2025  Previous U/S                      2/28/2025                        GA, GA 8 w + 6 d                                                                       21 w + 3 d                     10/4/2025  Assigned dating                  based on the LMP, selected on 05/27/2025                                                                         20 w + 5 d                     10/9/2025      GENERAL EVALUATION  ---------------------------------------------------------------------------------------------------------  Cardiac activity present.  bpm. Fetal movements: visualized. Presentation: cephalic  Placenta: Anterior, No Previa, > 2 cm from internal os  Umbilical cord: previously studied  Amniotic fluid: Amount of AF: normal. MVP 4.2 cm      FETAL ANATOMY  ---------------------------------------------------------------------------------------------------------  Fetal sex: male.      MATERNAL STRUCTURES  ---------------------------------------------------------------------------------------------------------  Cervix                                  Normal                                             Appearance: Appears closed.                                             Approach - Transvaginal: Cervical length 8.7 mm                                             Funneling present      RECOMMENDATION  ---------------------------------------------------------------------------------------------------------  Thank-you for referring your patient for a cervical length assessment.    We discussed the findings on today's ultrasound with the patient and her partner. She was started on vaginal progesterone with a cervical length of 18 mm, now with  shortening to < 10 mm despite ongoing progesterone.    She is not having any contractions, vaginal bleeding, or any signs or symptoms of infection. She last ate at 0630 this am (cereal) and has only had sips of water  since  then. We discussed the recommendation to consider cervical cerclage given progressive shortening to < 10 mm despite vaginal progesterone. They are interested in a  cervical cerclage and Dr. Acuna ([facility] on call) was contacted for case discussion. Patient will be sent to [facility] for cerclage evaluation and likely  placement.    She has a follow up scheduled with us on  and will come in that day for reassurance, with a limited US for fluid and fetal heart rate, and a transabdominal follow up on  the cervix.    Return to primary provider for continued prenatal care.    If you have questions regarding today's evaluation or if we can be of further service, please contact the [facility].    **Fetal anomalies may be present but not detected**        Impression    IMPRESSION  ---------------------------------------------------------------------------------------------------------  1. Darling pregnancy at 20w 5d gestational age by :<{ c/w 8 week US.  2. The amniotic fluid measurement is within normal limits.  3. On transvaginal imaging the cervix is closed but short, measuring between 8-10 mm with the shortest closed length of 8 mm.            Imaging     [facility] US OB Transvaginal (25)  1. Darling pregnancy at 20w 5d gestational age by :<{ c/w 8 week US.  2. The amniotic fluid measurement is within normal limits.  3. On transvaginal imaging the cervix is closed but short, measuring between 8-10 mm with the shortest closed length of 8 mm.     Assessment/Plan     39 year old  at 20w5d by LMP confirmed with 8 week US who presents from clinic with 8mm short cervix and evaluation for cerclage placement.    Shortened Cervix  On , ultrasound showed cervical shortening of 16.2mm and therefore she was prescribed prometrium 200mg at that time. At today's follow-up ultrasound, cervical length was 8mm therefore she is admitted for evaluation for placement of cerclage vs  concern for  labor. Plan to monitor for contractions to evaluate for  labor before planning for cerclage placement.  - 1 L LR bolus  -Continuous toco monitoring started  -Plan for pelvic speculum exam after continuous toco monitoring  -Wet prep, UA, Dominic/Chl ordered  -CBC, type &screen ordered  -NPO in case of cerclage placement    Fetal well being  - Doptones PRN if clinically indicated    Prenatal care  - Prenatal labs/studies as above  - Immunizations: Tdap 25  - Contraception: will discuss if delivery indicated  - Feeding: will discuss if delivery indicated   - GBS unknown    Inpatient management  - Up ad naima  - Regular diet  - SCDs for DVT prophylaxis  - Q72h CBC, type & screen    Delivery plan   - Mode of delivery: TBD  - Timing of delivery: TBD    Medically Ready for Discharge: Anticipated in 2-4 Days    Patient seen and care plan discussed under supervision of Dr. Samuel Acuna.    Codi Quiroz, MS4  [facility]    Physician Attestation   I saw this patient with the resident and agree with the resident/fellow's findings and plan of care as documented in the note.      Key findings: In summary, Cinthia Casillas is a  admitted with US finding of progressive cervical shortening despite treatment with vaginal progesterone.  She was noted to have a very short cervix < 10 mm on today's US.  Given this, we reviewed that this history is concerning for cervical insufficiency. ?We did also review the other differential diagnoses, including possibility of previable labor/miscarriage, possibility of intrauterine infection, or that the cervical shortening is a risk factor for  delivery, that may not be ameliorated with cerclage placement. We reviewed that the findings of intermittent uterine irritability initially was concerning for a uterine source of the US findings, but given the spontaneous resolution of this activity on toco, we reviewed that a uterine source would not be  expected to spontaneously resolve and that other etiologies of the irritability, such as dehydration or stress were the more likely etiologies.  There is no clinical suspicion of intrauterine infection.  We had a long discussion regarding the risks, benefits and alternatives of continued expectant management vs proceeding with ultrasound indicated cerclage placement.  We also discussed the expected outcomes, with dilation portending a shorter latency from cerclage placement until delivery (average 4 weeks) but shortening only without dilation having a high probability of delivery at or after 34 weeks gestation.  Cinthia opted to proceed with cerclage placement; informed consent obtained.      70 MINUTES SPENT BY ME on the date of service doing chart review, history, exam, documentation & further activities per the note.    I have personally reviewed the following data over the past 24 hrs:    11.8 (H)  \   13.1   / 177     N/A N/A N/A /  N/A   N/A N/A N/A \         Samuel Acuna MD  Date of Service (when I saw the patient): 05/27/25

## 2025-05-27 NOTE — PROGRESS NOTES
Please see full imaging report from [employer] program under imaging tab.    Thank-you for referring your patient for a cervical length assessment.     We discussed the findings on today's ultrasound with the patient and her partner. She was started on vaginal progesterone with a cervical length of 18 mm, now with shortening to < 10 mm despite ongoing progesterone.     She is not having any contractions, vaginal bleeding, or any signs or symptoms of infection. She last ate at 0630 this am (cereal) and has only had sips of water since then. We discussed the recommendation to consider cervical cerclage given progressive shortening to < 10 mm despite vaginal progesterone. They are interested in a cervical cerclage and Dr. Acuna ([facility] on call) was contacted for case discussion. [facility] cer[facility] evaluation and likely placement.    She has a follow up scheduled with us on 6/5 and will come in that day for reassurance, with a limited US for fluid and fetal heart rate, and a transabdominal follow up on the cervix.     Return to primary provider for continued prenatal care.    If you have questions regarding today's evaluation or if we can be of further service, please contact the [facility].    **Fetal anomalies may be present but not detected**     Chandler Petit MD  Maternal Fetal Medicine

## 2025-05-27 NOTE — NURSING NOTE
Patient presents to [facility] for TV at 20w5d due to short cervix. Positive fetal movement. Denies LOF, vaginal bleeding or cramping/contractions. Pts cervix is short today. Pt sent to labor and delivery at [facility] for further evaluation. Report given to the charge nurse at L/D at [facility]. SBAR given to DAMARI CHANG, see their note in Epic.

## 2025-05-28 ENCOUNTER — HOSPITAL ENCOUNTER (OUTPATIENT)
Facility: CLINIC | Age: 40
End: 2025-05-28
Admitting: OBSTETRICS & GYNECOLOGY
Payer: COMMERCIAL

## 2025-05-28 VITALS
TEMPERATURE: 97.9 F | BODY MASS INDEX: 25.9 KG/M2 | OXYGEN SATURATION: 97 % | HEIGHT: 67 IN | HEART RATE: 77 BPM | DIASTOLIC BLOOD PRESSURE: 64 MMHG | RESPIRATION RATE: 17 BRPM | WEIGHT: 165 LBS | SYSTOLIC BLOOD PRESSURE: 97 MMHG

## 2025-05-28 DIAGNOSIS — O34.32 CERVICAL INSUFFICIENCY DURING PREGNANCY IN SECOND TRIMESTER, ANTEPARTUM: Primary | ICD-10-CM

## 2025-05-28 LAB
C TRACH DNA SPEC QL PROBE+SIG AMP: NEGATIVE
N GONORRHOEA DNA SPEC QL NAA+PROBE: NEGATIVE
SPECIMEN TYPE: NORMAL

## 2025-05-28 PROCEDURE — 250N000013 HC RX MED GY IP 250 OP 250 PS 637: Performed by: OBSTETRICS & GYNECOLOGY

## 2025-05-28 PROCEDURE — 250N000013 HC RX MED GY IP 250 OP 250 PS 637

## 2025-05-28 PROCEDURE — 99239 HOSP IP/OBS DSCHRG MGMT >30: CPT | Performed by: OBSTETRICS & GYNECOLOGY

## 2025-05-28 PROCEDURE — 250N000011 HC RX IP 250 OP 636

## 2025-05-28 RX ORDER — METRONIDAZOLE 500 MG/1
500 TABLET ORAL 2 TIMES DAILY
Qty: 13 TABLET | Refills: 0 | Status: SHIPPED | OUTPATIENT
Start: 2025-05-28

## 2025-05-28 RX ORDER — OXYCODONE HYDROCHLORIDE 5 MG/1
5 TABLET ORAL EVERY 4 HOURS PRN
Refills: 0 | Status: DISCONTINUED | OUTPATIENT
Start: 2025-05-28 | End: 2025-05-28 | Stop reason: HOSPADM

## 2025-05-28 RX ORDER — METRONIDAZOLE 500 MG/1
500 TABLET ORAL 2 TIMES DAILY
Status: DISCONTINUED | OUTPATIENT
Start: 2025-05-28 | End: 2025-05-28 | Stop reason: HOSPADM

## 2025-05-28 RX ADMIN — DOXYLAMINE SUCCINATE 25 MG: 25 TABLET ORAL at 00:48

## 2025-05-28 RX ADMIN — CEFAZOLIN SODIUM 2 G: 2 SOLUTION INTRAVENOUS at 04:28

## 2025-05-28 RX ADMIN — ACETAMINOPHEN 650 MG: 325 TABLET ORAL at 10:35

## 2025-05-28 RX ADMIN — ACETAMINOPHEN 650 MG: 325 TABLET ORAL at 00:14

## 2025-05-28 RX ADMIN — INDOMETHACIN 50 MG: 25 CAPSULE ORAL at 14:58

## 2025-05-28 RX ADMIN — INDOMETHACIN 50 MG: 25 CAPSULE ORAL at 07:29

## 2025-05-28 RX ADMIN — METRONIDAZOLE 500 MG: 500 TABLET ORAL at 07:29

## 2025-05-28 RX ADMIN — CEFAZOLIN SODIUM 2 G: 2 SOLUTION INTRAVENOUS at 12:37

## 2025-05-28 ASSESSMENT — ACTIVITIES OF DAILY LIVING (ADL)
ADLS_ACUITY_SCORE: 17
ADLS_ACUITY_SCORE: 19
ADLS_ACUITY_SCORE: 17
ADLS_ACUITY_SCORE: 16
ADLS_ACUITY_SCORE: 17

## 2025-05-28 NOTE — PROGRESS NOTES
SBAR received from Millicent DE LA PAZ RN at 2257 when pt was brought back to room 422. Pt tolerating PO fluids and apple sauce. Pt does not feel they are able to bear weight or walk yet. SCD's on and pt has call light within reach and is able to make needs known.  TJ at the bedside and attentive to pt.

## 2025-05-28 NOTE — PROVIDER NOTIFICATION
05/28/25 0120   Provider Notification   Provider Name/Title Dr. Zavala   Method of Notification In Department   Request Evaluate - Remote   Notification Reason Status Update     Attempted to void with pt at 0040, pt going to increase PO intake and reattempt at 6 hours post spinal.

## 2025-05-28 NOTE — PLAN OF CARE
VSS, afebrile. Pt denies LOF. Reports spotting when voiding, 2/10 pain (see flowsheet and MAR), and occasional contraction. Pt has been able to eat, drink, walk, and void all before 0212 this morning. Pt appears anxious about situation but states they are hopeful. Pt spouse TJ left bedside to be at home with pets but supportive and engaging with pt when at the bedside. Pt educated about reasons to call out, has call light within reach and is able to make needs known. SBAR given to Bree DONAHUE RN who assumes care at this time.

## 2025-05-28 NOTE — PLAN OF CARE
Goal Outcome Evaluation:      Plan of Care Reviewed With: patient, spouse    Overall Patient Progress: improvingOverall Patient Progress: improving     Patient okayed to be discharged to home per Dr Acuna. Patient instructed on discharge instructions, what to expect after cerclage and follow up plan. Discharge papers given to patient. IV removed. Patient given discharge medication and administration instructions, patient stated she understands. Patient discharged to home at 1645.

## 2025-05-28 NOTE — PROGRESS NOTES
"[facility]  Antepartum Progress Note     Subjective   Cinthia is feeling better this AM.  Cramping has resolved, no pain, vaginal bleeding or LOF.  Denies fever, chills, nausea or vomiting.  We reviewed the events of yesterday, including finding of cervical dilation at the time of cerclage placement.  Endorses normal fetal movement.      Objective    /71   Pulse 77   Temp 97.9  F (36.6  C) (Oral)   Resp 17   Ht 1.702 m (5' 7\")   Wt 74.8 kg (165 lb)   LMP 01/02/2025   SpO2 97%   BMI 25.84 kg/m         Gen:      Resting comfortably, NAD  CV:       well perfused  Pulm:    Non-labored breathing on room air  Abd:      Gravid, non-tender, non-distended   Ext:       No edema       Dopp tones: 150's   Champion: 0 contractions in 10 minutes     Labs     Recent Results (from the past 48 hours)   UA with Microscopic reflex to Culture    Collection Time: 05/27/25  3:40 PM    Specimen: Urine, Clean Catch   Result Value Ref Range    Color Urine Yellow Colorless, Straw, Light Yellow, Yellow    Appearance Urine Slightly Cloudy (A) Clear    Glucose Urine Negative Negative mg/dL    Bilirubin Urine Negative Negative    Ketones Urine 80 (A) Negative mg/dL    Specific Gravity Urine 1.015 1.003 - 1.035    Blood Urine Negative Negative    pH Urine 7.0 5.0 - 7.0    Protein Albumin Urine Negative Negative mg/dL    Urobilinogen Urine Normal Normal mg/dL    Nitrite Urine Negative Negative    Leukocyte Esterase Urine Trace (A) Negative    Mucus Urine Present (A) None Seen /LPF    RBC Urine 14 (H) <=2 /HPF    WBC Urine <1 <=5 /HPF    Squamous Epithelials Urine <1 <=1 /HPF   CBC with platelets    Collection Time: 05/27/25  3:59 PM   Result Value Ref Range    WBC Count 11.8 (H) 4.0 - 11.0 10e3/uL    RBC Count 4.14 3.80 - 5.20 10e6/uL    Hemoglobin 13.1 11.7 - 15.7 g/dL    Hematocrit 36.4 35.0 - 47.0 %    MCV 88 78 - 100 fL    MCH 31.6 26.5 - 33.0 pg    MCHC 36.0 31.5 - 36.5 g/dL    RDW 12.2 " 10.0 - 15.0 %    Platelet Count 177 150 - 450 10e3/uL   Adult Type and Screen    Collection Time: 25  3:59 PM   Result Value Ref Range    ABO/RH(D) O NEG     Antibody Screen Negative Negative    SPECIMEN EXPIRATION DATE 2025 11:59:00 PM CDT    Wet preparation    Collection Time: 25  9:39 PM    Specimen: Vagina; Swab   Result Value Ref Range    Trichomonas Absent Absent    Yeast Absent Absent    Clue Cells Present (A) Absent    WBCs/high power field 2+ (A) None       Imaging     Recent Results (from the past 24 hours)   MFM US OB Transvaginal    Narrative            Cx TV  ---------------------------------------------------------------------------------------------------------  Pat. Name: LIU HAYES       Study Date:  2025 1:31pm  Pat. NO:  [id number]        Referring  MD: STARR ISAACS  Site:         Sonographer: Farheen Felton RDMS  :  1985        Age:   39  ---------------------------------------------------------------------------------------------------------    INDICATION  ---------------------------------------------------------------------------------------------------------  Short cervix on vaginal progesterone      METHOD  ---------------------------------------------------------------------------------------------------------  Transabdominal and transvaginal ultrasound examination. View: Sufficient      PREGNANCY  ---------------------------------------------------------------------------------------------------------  Darling pregnancy. Number of fetuses: 1      DATING  ---------------------------------------------------------------------------------------------------------                                           Date                                Details                                                                                      Gest. age                      ALEX  LMP                                  2025                                                                                                                            20 w + 5 d                     10/9/2025  Previous U/S                      2/28/2025                        GA, GA 8 w + 6 d                                                                       21 w + 3 d                     10/4/2025  Assigned dating                  based on the LMP, selected on 05/27/2025                                                                         20 w + 5 d                     10/9/2025      GENERAL EVALUATION  ---------------------------------------------------------------------------------------------------------  Cardiac activity present.  bpm. Fetal movements: visualized. Presentation: cephalic  Placenta: Anterior, No Previa, > 2 cm from internal os  Umbilical cord: previously studied  Amniotic fluid: Amount of AF: normal. MVP 4.2 cm      FETAL ANATOMY  ---------------------------------------------------------------------------------------------------------  Fetal sex: male.      MATERNAL STRUCTURES  ---------------------------------------------------------------------------------------------------------  Cervix                                  Normal                                             Appearance: Appears closed.                                             Approach - Transvaginal: Cervical length 8.7 mm                                             Funneling present      RECOMMENDATION  ---------------------------------------------------------------------------------------------------------  Thank-you for referring your patient for a cervical length assessment.    We discussed the findings on today's ultrasound with the patient and her partner. She was started on vaginal progesterone with a cervical length of 18 mm, now with  shortening to < 10 mm despite ongoing progesterone.    She is not having any contractions, vaginal bleeding, or any signs or symptoms of infection. She last ate at 0630  this am (cereal) and has only had sips of water since  then. We discussed the recommendation to consider cervical cerclage given progressive shortening to < 10 mm despite vaginal progesterone. They are interested in a  cervical cerclage and Dr. Acuna ([facility] on call) was contacted for case discussion. Patient will be sent to [facility] for cerclage evaluation and likely  placement.    She has a follow up scheduled with us on  and will come in that day for reassurance, with a limited US for fluid and fetal heart rate, and a transabdominal follow up on  the cervix.    Return to primary provider for continued prenatal care.    If you have questions regarding today's evaluation or if we can be of further service, please contact the [facility].    **Fetal anomalies may be present but not detected**        Impression    IMPRESSION  ---------------------------------------------------------------------------------------------------------  1. Darling pregnancy at 20w 5d gestational age by :<{ c/w 8 week US.  2. The amniotic fluid measurement is within normal limits.  3. On transvaginal imaging the cervix is closed but short, measuring between 8-10 mm with the shortest closed length of 8 mm.              Assessment/Plan   39 year old  at 20w6d by LMP confirmed with 8 week US who presents from clinic with 8mm short cervix and finding of cervical dilation at time of cerclage placement (1 cm dilation) s/p exam indicated cerclage - POD #1.     Shortened Cervix with dilation noted at cerclage placement  On , ultrasound showed cervical shortening of 16.2mm and therefore she was prescribed prometrium 200mg at that time and progressive shortening noted at time of follow up US.  S/P exam indicated cerclage placement 25.     Given cervical dilation, will continue IV ancef and Indocin x 24 hours as these interventions have been demonstrated to prolong latency after cerclage when  cervical dilation is present.  We then reviewed that given cervical dilation, mean prolongation is ~ 4 weeks, but is highly variable, particularly for individuals who remain pregnant beyond 4 weeks and while we remain at risk for  birth, this is not to imply that a term birth is no longer possible, as this is still a possibility in our case.  Close surveillance is indicated for signs/symptoms  labor -  none currently.      Fetal well being  - Doptones daily, toco TID     Prenatal care  - Prenatal labs/studies as above  - Immunizations: Tdap 25  - GBS unknown     Inpatient management  - Up ad naima  - Regular diet  - SCDs for DVT prophylaxis  - Q72h CBC, type & screen  - Diagnosed with BV on wet prep yesterday - plan 7 day course of Flagyl    If patient stable this afternoon, will plan discharge home today with planned follow up with MFM in 2 weeks and follow up with primary Ob-gyn in 4 weeks.     Samuel Acuna MD    45 MINUTES SPENT BY ME on the date of service doing chart review, history, exam, documentation & further activities per the note.

## 2025-05-28 NOTE — OP NOTE
Operative Note: Cervical Cerclage         Pre-Op Diagnosis:   1) Single intrauterine pregnancy at 20w5d   2) Cervical insufficiency diagnosed by previable cervical dlation         Post-Op Diagnosis:   1) Same         Procedure:   1) Vu cervical cerclage, 2 sutures (knot at 12 o'clock and one o'clock positions)         Surgeons:   Attending: Samuel Acuna MD  Resident: Alfredo Pacheco, PGY2         Anesthesia:   Spinal          Estimated Blood Loss:   10cc         Findings:   1) Cervix 1 cm dilated prior to the procedure, closed following the procedure  2) Fetal heart tones confirmed by doptone following the procedure         Specimens:   1) None         Complications:   1) None apparent          History:     Cinthia Casillas is a  at 20w5d admitted for suspected cervical insufficiency.  After counseling regarding these findings, the patient has decided to proceed with exam indicated cerclage placement.          Details of Procedure:   After administration of spinal anesthesia the patient was placed in the dorsal lithotomy position and prepped and draped in the usual fashion.  A weighted speculum was placed into the vagina and right angle retractors were used to visualize the cervix. The vagina and cervix were copiously cleansed with betadine solution.    A circumferential suture of #2 Ethilon was placed in the usual fashion at the level of the external os and utilized as a stay suture.  A circumferential suture of #2 Ethilon was placed in the usual fashion at the level of the cervicovaginal reflection with knot tied at 12 o'clock position.   A second suture of #2 Ethilon was placed approximately 1cm proximal to the first stitch at the 1 o'clock position.  Both sutures were securely tied down and the stay suture was removed; digital exam confirmed that the cervix was closed.    The bladder was drained of clear urine and a rectal exam confirmed that no sutures were present in the rectum.  The cervix and  vaginal vault were inspected and noted to be free of injury and hemostatic.      The instruments were removed from the vagina. She tolerated her spinal anesthesia well without incident. She was subsequently transferred to the recovery room in satisfactory condition.    Sponge and needle counts were correct at the close of the case x 2.    Samuel Acuna MD

## 2025-05-28 NOTE — PROVIDER NOTIFICATION
05/27/25 1947   Provider Notification   Provider Name/Title Dr. Acuna   Method of Notification In Department   Request Evaluate - Remote   Notification Reason Uterine Activity     Pt still feeling occasional right sided tightening, not different than she has been feeling. Pt position adjusted and educated to communicate if worsening. Dr. Acuna aware.

## 2025-05-28 NOTE — ANESTHESIA PROCEDURE NOTES
"Intrathecal injection Procedure Note    Pre-Procedure   Staff -        Resident/Fellow: Carla Bartlett DO       Performed By: resident       Location: OB       Pre-Anesthestic Checklist: patient identified, IV checked, risks and benefits discussed, informed consent, monitors and equipment checked, pre-op evaluation, at physician/surgeon's request and post-op pain management  Timeout:       Correct Patient: Yes        Correct Procedure: Yes        Correct Site: Yes        Correct Position: Yes   Procedure Documentation  Procedure: intrathecal injection         Diagnosis: labor analgesia       Patient Position: sitting       Skin prep: Chloraprep       Insertion Site: L3-4. (midline approach).       Needle Gauge: 25.        Needle Length (Inches): 3.5        Spinal Needle Type: Pencan       Introducer used       Introducer: 20 G       # of attempts: 1 and  # of redirects:  0    Assessment/Narrative         Paresthesias: No.       Sensory Level: T4       CSF fluid: clear.      FOR [facility] ([facility]) ONLY:   Pain Team Contact information: please page the Pain Team Via [employer]. Search \"Pain\". During daytime hours, please page the attending first. At night please page the resident first.      "

## 2025-05-28 NOTE — ANESTHESIA PROCEDURE NOTES
"Intrathecal injection Procedure Note    Pre-Procedure   Staff -        Anesthesiologist:  Larisa Jones MD       Resident/Fellow: Carla Bartlett DO       Performed By: resident       Location: OB       Procedure Start/Stop Times: 5/27/2025 8:38 PM and 5/27/2025 8:42 PM       Pre-Anesthestic Checklist: patient identified, IV checked, risks and benefits discussed, informed consent, monitors and equipment checked, pre-op evaluation, at physician/surgeon's request and post-op pain management  Timeout:       Correct Patient: Yes        Correct Procedure: Yes        Correct Site: Yes        Correct Position: Yes   Procedure Documentation  Procedure: intrathecal injection         Diagnosis: labor analgesia       Patient Position: sitting       Skin prep: Chloraprep       Insertion Site: L3-4. (midline approach).       Needle Gauge: 25.        Needle Length (Inches): 3.5        Spinal Needle Type: Pencan       Introducer used       Introducer: 20 G       # of attempts: 1 and  # of redirects:  1    Assessment/Narrative         Paresthesias: No.       Sensory Level: T8       CSF fluid: clear.    Medication(s) Administered   0.75% Hyperbaric Bupivacaine (Intrathecal) - Intrathecal   1.5 mL - 5/27/2025 8:38:00 PM  Medication Administration Time: 5/27/2025 8:38 PM      FOR [facility] ([facility]) ONLY:   Pain Team Contact information: please page the Pain Team Via [employer]. Search \"Pain\". During daytime hours, please page the attending first. At night please page the resident first.      "

## 2025-05-28 NOTE — PROVIDER NOTIFICATION
05/28/25 0500   Provider Notification   Provider Name/Title Dr. Zavala   Method of Notification In Department   Request Evaluate - Remote   Notification Reason Status Update     Pt reports not feeling anymore contractions but has generalized pain from cerclage placement.

## 2025-05-28 NOTE — PROVIDER NOTIFICATION
05/28/25 0018   Provider Notification   Provider Name/Title Dr. Zavala   Method of Notification In Department   Request Evaluate - Remote   Notification Reason Pain     Pt having 6/10 pain in lower uterine region. PRN Oxycodone order for pt if no relief from Tylenol.

## 2025-05-28 NOTE — ANESTHESIA CARE TRANSFER NOTE
Patient: Cinthia Casillas    Procedure: Procedure(s):  Cerclage cervical       Diagnosis: * No pre-op diagnosis entered *  Diagnosis Additional Information: No value filed.    Anesthesia Type:   Spinal     Note:    Oropharynx: oropharynx clear of all foreign objects and spontaneously breathing  Level of Consciousness: awake  Oxygen Supplementation: room air    Independent Airway: airway patency satisfactory and stable  Dentition: dentition unchanged  Vital Signs Stable: post-procedure vital signs reviewed and stable  Report to RN Given: handoff report given  Patient transferred to: Labor and Delivery    Handoff Report: Identifed the Patient, Identified the Reponsible Provider, Reviewed the pertinent medical history, Discussed the surgical course, Reviewed Intra-OP anesthesia mangement and issues during anesthesia, Set expectations for post-procedure period and Allowed opportunity for questions and acknowledgement of understanding      Vitals:  Vitals Value Taken Time   BP     Temp     Pulse 75 05/27/25 21:47   Resp 16 05/27/25 21:47   SpO2 100 % 05/27/25 21:47   Vitals shown include unfiled device data.    Electronically Signed By: Carla Bartlett DO  May 27, 2025  9:48 PM

## 2025-05-28 NOTE — DISCHARGE SUMMARY
[facility] Discharge Summary    Cinthia Casillas MRN# [id number]   Age: 39 year old YOB: 1985     Date of Admission:  5/27/2025  Date of Discharge::  5/28/2025  Admitting Physician:  Samuel Acuna MD  Discharge Physician:  Samuel Acuna MD            Admission Diagnoses:   1) Single intrauterine pregnancy at 20w5d   2) Cervical insufficiency diagnosed by previable cervical dlation         Discharge Diagnosis:     -IUP at 20w6d, s/p Vu cerclage placement          Procedures:     Procedure(s): Vu cervical cerclage, 2 sutures (knot at 12 o'clock and one o'clock positions)             Medications Prior to Admission:     Medications Prior to Admission   Medication Sig Dispense Refill Last Dose/Taking    doxylamine (UNISOM) 25 MG TABS tablet Take 25 mg by mouth at bedtime.   5/26/2025    omeprazole (PRILOSEC) 40 MG DR capsule Take 40 mg by mouth daily.   5/27/2025    Prenatal Vit-Fe Fumarate-FA (PRENATAL MULTIVITAMIN  PLUS IRON) 27-1 MG TABS Take by mouth daily.   5/26/2025    progesterone (PROMETRIUM) 200 MG capsule Place 1 capsule (200 mg) vaginally at bedtime. 30 capsule 3 5/26/2025             Discharge Medications:        Review of your medicines        START taking        Dose / Directions   metroNIDAZOLE 500 MG tablet  Commonly known as: FLAGYL  Indication: BV  Used for: Bacterial vaginosis in pregnancy      Dose: 500 mg  Take 1 tablet (500 mg) by mouth 2 times daily. First dose 5/28 pm after discharge.  Quantity: 13 tablet  Refills: 0            CONTINUE these medicines which have NOT CHANGED        Dose / Directions   doxylamine 25 MG Tabs tablet  Commonly known as: UNISOM      Dose: 25 mg  Take 25 mg by mouth at bedtime.  Refills: 0     omeprazole 40 MG DR capsule  Commonly known as: PriLOSEC      Dose: 40 mg  Take 40 mg by mouth daily.  Refills: 0     prenatal multivitamin  plus iron 27-1 MG Tabs      Take by mouth daily.  Refills: 0     progesterone 200 MG  capsule  Commonly known as: PROMETRIUM  Used for: Short cervix affecting pregnancy      Dose: 200 mg  Place 1 capsule (200 mg) vaginally at bedtime.  Quantity: 30 capsule  Refills: 3               Where to get your medicines        These medications were sent to [place], [place] - [address]  [address] [facility] [address], [facility] [address]      Phone: [phone]   metroNIDAZOLE 500 MG tablet               Brief Admission History   Cinthia Casillas is a  at 20w5d admitted for suspected cervical insufficiency.  After counseling regarding these findings, the patient has decided to proceed with exam indicated cerclage placement.            Hospital Course:   Vu cervical cerclage was placed om 25 after a period of observation, as occasional uterine irritability was note on toco, which resolved with IV fluid hydration.  Cerclage placement was uncomplicated but cervical dilation of 1 cm was noted at the time of cerclage placement.  Cinthia received 24 hours of Indocin and ancef given cervical dilation to prolong latency, which she tolerated well.  She was diagnosed with bacterial vaginosis and treatment with Flagyl was initiated.  She was discharged home on POD #1 in excellent condition with no apparent post-operative complications.  Recommend cerclage removal at 36-37 weeks gestation, or if evidence of PTL/PPROM.  We will plan to call her tomorrow to set up weekly limited US and assess any symptoms.  Precautions reviewed.             Discharge Instructions and Follow-Up:     Discharge diet: Regular   Discharge activity: Pelvic rest, otherwise ad naima.  Avoid straining/strenuous activity.    Discharge follow-up: Follow up with MFM in 1 week and primary OB gyn as scheduled in 4 weeks.   Complete 7 day course of Flagyl (patient sent home with filled prescription)           Discharge Disposition:     Discharged to home      Samuel Acuna MD    50 MINUTES SPENT BY ME on the date  of service doing chart review, history, exam, documentation & further activities per the note.

## 2025-05-28 NOTE — DISCHARGE INSTRUCTIONS
Learning About When to Call Your Doctor During Pregnancy (Up to 20 Weeks)  Overview     It's common to have concerns about what might be a problem during your pregnancy. Most pregnancies don't have any serious problems. But it's still important to know when to call your doctor if you have certain symptoms.  These are general suggestions. Your doctor may give you some more information about when to call.  When to call your doctor (up to 20 weeks)  Call 911 anytime you think you may need emergency care. For example, call if:  You have severe vaginal bleeding. This means you are soaking through a pad each hour for 2 or more hours.  You have chest pain, are short of breath, or cough up blood.  You have sudden, severe pain in your belly.  You passed out (lost consciousness).  Call your doctor now or seek immediate medical care if:  You have a fever.  You have vaginal bleeding.  You are dizzy or lightheaded, or you feel like you may faint.  You have signs of a blood clot in your leg (called a deep vein thrombosis), such as:  Pain in the calf, back of the knee, thigh, or groin.  Swelling in your leg or groin.  A color change on the leg or groin. The skin may be reddish or purplish, depending on your usual skin color.  You have symptoms of a urinary tract infection. These may include:  Pain or burning when you urinate.  A frequent need to urinate without being able to pass much urine.  Pain in the flank, which is just below the rib cage and above the waist on either side of the back.  Blood in your urine.  You have belly pain.  You think you are having contractions.  Watch closely for changes in your health, and be sure to contact your doctor if:  You have vaginal discharge that smells bad.  You feel sad, anxious, or hopeless for more than a few days.  You have other concerns about your pregnancy.  Follow-up care is a key part of your treatment and safety. Be sure to make and go to all appointments, and call your doctor  "if you are having problems. It's also a good idea to know your test results and keep a list of the medicines you take.    [facility] phone # [phone]      Where can you learn more?  Go to [email or web address].net/patiented  Enter G674 in the search box to learn more about \"Learning About When to Call Your Doctor During Pregnancy (Up to 20 Weeks).\"  Current as of: April 30, 2024  Content Version: 14.4    2565-6859 [employer].   Care instructions adapted under license by your healthcare professional. If you have questions about a medical condition or this instruction, always ask your healthcare professional. [employer] disclaims any warranty or liability for your use of this information.      " thick mucous

## 2025-05-28 NOTE — PROVIDER NOTIFICATION
"   05/28/25 1415   Provider Notification   Provider Name/Title Dr Ocasio   Method of Notification In Department   Request Evaluate - Remote   Notification Reason Uterine Activity     Patient having irritability on the the toco monitor. Plan to have patient empty her bladder and reposition to side to see if the irritability subsides. Patient denies feeling tightening and reports \"Cramping in the cervix\" and not abdomen.  "

## 2025-05-28 NOTE — ANESTHESIA PREPROCEDURE EVALUATION
"Anesthesia Pre-Procedure Evaluation    Patient: Cinthia Casillas   MRN: [id number] : 1985          Procedure : Procedure(s):  Cerclage cervical         No past medical history on file.   Past Surgical History:   Procedure Laterality Date    DILATION AND CURETTAGE        No Known Allergies   Social History     Tobacco Use    Smoking status: Not on file    Smokeless tobacco: Not on file   Substance Use Topics    Alcohol use: Not on file      Wt Readings from Last 1 Encounters:   25 74.8 kg (165 lb)        Anesthesia Evaluation   Pt has had prior anesthetic. Type: General and MAC.        ROS/MED HX  ENT/Pulmonary:  - neg pulmonary ROS     Neurologic:  - neg neurologic ROS     Cardiovascular:  - neg cardiovascular ROS     METS/Exercise Tolerance:     Hematologic:       Musculoskeletal:       GI/Hepatic:     (+) GERD, Asymptomatic on medication,                  Renal/Genitourinary:  - neg Renal ROS     Endo:  - neg endo ROS     Psychiatric/Substance Use:       Infectious Disease:  - neg infectious disease ROS     Malignancy:  - neg malignancy ROS     Other:              Physical Exam  Airway  Mallampati: II  TM distance: >3 FB  Neck ROM: full  Mouth opening: >= 4 cm    Cardiovascular - normal exam  Rhythm: regular     Dental   (+) Completely normal teeth      Pulmonary Breath sounds clear to auscultation        Neurological   Other Findings       OUTSIDE LABS:  CBC:   Lab Results   Component Value Date    WBC 11.8 (H) 2025    WBC 9.2 2025    HGB 13.1 2025    HGB 13.5 2025    HCT 36.4 2025    HCT 38.8 2025     2025     2025     BMP: No results found for: \"NA\", \"POTASSIUM\", \"CHLORIDE\", \"CO2\", \"BUN\", \"CR\", \"GLC\"  COAGS: No results found for: \"PTT\", \"INR\", \"FIBR\"  POC: No results found for: \"BGM\", \"HCG\", \"HCGS\"  HEPATIC: No results found for: \"ALBUMIN\", \"PROTTOTAL\", \"ALT\", \"AST\", \"GGT\", \"ALKPHOS\", \"BILITOTAL\", \"BILIDIRECT\", \"EMILIANO\"  OTHER:   Lab " Results   Component Value Date    A1C 5.2 04/02/2025    TSH 0.83 10/24/2023       Anesthesia Plan    ASA Status:  2      NPO Status: NPO Appropriate   Anesthesia Type: Spinal.   Techniques and Equipment:       - Monitoring Plan: standard ASA monitoring     Consents    Anesthesia Plan(s) and associated risks, benefits, and realistic alternatives discussed. Questions answered and patient/representative(s) expressed understanding.     - Discussed:     - Discussed with:  Patient               Postoperative Care         Comments:                   Larisa Jones MD    I have reviewed the pertinent notes and labs in the chart from the past 30 days and (re)examined the patient.  Any updates or changes from those notes are reflected in this note.    Clinically Significant Risk Factors Present on Admission

## 2025-05-28 NOTE — ANESTHESIA POSTPROCEDURE EVALUATION
Patient: Cinthia Casillas    Procedure: Procedure(s):  Cerclage cervical       Anesthesia Type:  Spinal    Note:  Disposition: Inpatient   Postop Pain Control: Uneventful            Sign Out: Well controlled pain   PONV: No   Neuro/Psych: Uneventful            Sign Out: Acceptable/Baseline neuro status   Airway/Respiratory: Uneventful            Sign Out: Acceptable/Baseline resp. status   CV/Hemodynamics: Uneventful            Sign Out: Acceptable CV status; No obvious hypovolemia; No obvious fluid overload   Other NRE: NONE   DID A NON-ROUTINE EVENT OCCUR? No           Last vitals:  Vitals Value Taken Time   /84 05/27/25 22:00   Temp 36.9  C (98.5  F) 05/27/25 21:45   Pulse 78 05/27/25 22:04   Resp 18 05/27/25 22:04   SpO2 100 % 05/27/25 22:04   Vitals shown include unfiled device data.    Electronically Signed By: Larisa Jones MD  May 27, 2025  10:31 PM   [address] [facility], [place], [place]

## 2025-06-05 ENCOUNTER — TELEPHONE (OUTPATIENT)
Dept: MATERNAL FETAL MEDICINE | Facility: CLINIC | Age: 40
End: 2025-06-05

## 2025-06-05 ENCOUNTER — HOSPITAL ENCOUNTER (OUTPATIENT)
Dept: ULTRASOUND IMAGING | Facility: CLINIC | Age: 40
End: 2025-06-05
Attending: OBSTETRICS & GYNECOLOGY
Payer: COMMERCIAL

## 2025-06-05 ENCOUNTER — OFFICE VISIT (OUTPATIENT)
Dept: MATERNAL FETAL MEDICINE | Facility: CLINIC | Age: 40
End: 2025-06-05
Attending: OBSTETRICS & GYNECOLOGY
Payer: COMMERCIAL

## 2025-06-05 DIAGNOSIS — O34.32 CERVICAL CERCLAGE SUTURE PRESENT IN SECOND TRIMESTER: Primary | ICD-10-CM

## 2025-06-05 DIAGNOSIS — O09.522 MULTIGRAVIDA OF ADVANCED MATERNAL AGE IN SECOND TRIMESTER: ICD-10-CM

## 2025-06-05 DIAGNOSIS — O26.879 SHORT CERVIX AFFECTING PREGNANCY: ICD-10-CM

## 2025-06-05 DIAGNOSIS — O34.32 CERVICAL INSUFFICIENCY DURING PREGNANCY IN SECOND TRIMESTER, ANTEPARTUM: ICD-10-CM

## 2025-06-05 PROCEDURE — 76815 OB US LIMITED FETUS(S): CPT

## 2025-06-05 NOTE — NURSING NOTE
Cinthia presents to [facility] for limited ultrasound to assess cervix transabdominally, fluid and FHR. Patient reports positive fetal movement,  reports possible contractions that are painless about 4x/day, denies leaking of fluid, or bleeding.  SBAR given to [facility] MD, see their note in Epic.    Cinthia is inquired about NICU consult being scheduled. Spoke with [facility] Shira who will reach out to patient with date options for NICU consult.

## 2025-06-05 NOTE — PROGRESS NOTES
The patient was seen for an ultrasound in the [facility]etal Medicine Center at the [facility] today.  For a detailed report of the ultrasound examination, please see the ultrasound report which can be found under the imaging tab.    If you have questions regarding today's evaluation or if we can be of further service, please contact the Maternal-Fetal Medicine Center.    Key Mercado MD  , OB/GYN  Maternal-Fetal Medicine  [phone] (Pager)

## 2025-06-11 ENCOUNTER — DOCUMENTATION ONLY (OUTPATIENT)
Dept: CARE COORDINATION | Facility: CLINIC | Age: 40
End: 2025-06-11

## 2025-06-11 ENCOUNTER — ANCILLARY PROCEDURE (OUTPATIENT)
Dept: ULTRASOUND IMAGING | Facility: HOSPITAL | Age: 40
End: 2025-06-11
Attending: OBSTETRICS & GYNECOLOGY
Payer: COMMERCIAL

## 2025-06-11 ENCOUNTER — OFFICE VISIT (OUTPATIENT)
Dept: MATERNAL FETAL MEDICINE | Facility: HOSPITAL | Age: 40
End: 2025-06-11
Attending: OBSTETRICS & GYNECOLOGY
Payer: COMMERCIAL

## 2025-06-11 ENCOUNTER — VIRTUAL VISIT (OUTPATIENT)
Dept: MATERNAL FETAL MEDICINE | Facility: CLINIC | Age: 40
End: 2025-06-11
Attending: STUDENT IN AN ORGANIZED HEALTH CARE EDUCATION/TRAINING PROGRAM
Payer: COMMERCIAL

## 2025-06-11 DIAGNOSIS — O26.879 SHORT CERVIX AFFECTING PREGNANCY: ICD-10-CM

## 2025-06-11 DIAGNOSIS — O34.32 CERVICAL CERCLAGE SUTURE PRESENT IN SECOND TRIMESTER: ICD-10-CM

## 2025-06-11 DIAGNOSIS — O34.32 CERVICAL CERCLAGE SUTURE PRESENT IN SECOND TRIMESTER: Primary | ICD-10-CM

## 2025-06-11 DIAGNOSIS — O34.32 CERVICAL INSUFFICIENCY DURING PREGNANCY IN SECOND TRIMESTER, ANTEPARTUM: ICD-10-CM

## 2025-06-11 PROCEDURE — 99207 PR NO CHARGE LOS: CPT | Performed by: OBSTETRICS & GYNECOLOGY

## 2025-06-11 PROCEDURE — 76816 OB US FOLLOW-UP PER FETUS: CPT

## 2025-06-11 PROCEDURE — 76816 OB US FOLLOW-UP PER FETUS: CPT | Mod: 26 | Performed by: OBSTETRICS & GYNECOLOGY

## 2025-06-11 NOTE — NURSING NOTE
Cinthia Casillas is a  at 22w6d who presents to [facility] for a follow-up growth ultrasound. Pt reports positive fetal movement. Pt denies bldg/lof/change in discharge, contractions, headache, vision changes, chest pain/SOB or edema. SBAR given to Dr. Funez, see note in Epic.      Juanita Connor RN

## 2025-06-11 NOTE — PROGRESS NOTES
Please refer to ultrasound report under 'Imaging' Studies of 'Chart Review' tabs.    Maico Funez M.D.

## 2025-06-12 NOTE — PROGRESS NOTES
[facility]  MATERNAL CHILD HEALTH   [facility] NICU CONSULT     DATA:     TEENA participated in NICU consult at the [facility]etal Medicine Center on  with Neonatologist, Dr Suly Gross.      Patient is 39 year-old Cinthia Casillas.  She is a .  She is currently 23.4 weeks gestation.  She knows the baby she carries is a boy.  Cinthia is accompanied today by her spouse, BRANDO Rae.  They have not yet chosen a name for baby.     Cinthia's pregnancy is complicated by diagnosis complicated by previable cervical dilation and cervical cerclage placement at 20.5 weeks gestation.     Cinthia and BRANDO requested this NICU consult to discuss  outcomes and a 's needs at varying gestational ages should their son be born prematurely.      TEAM INTERVENTION:     Participated in NICU consultation   Answered family questions regarding leave from work if baby has extended NICU admission and how parents, generally, approach this.    SW provided validation of family's appropriate worry and concern for their baby.    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  This medical background gives her a foundation of understanding about NICU environment and medical terminology.  Cinthia and BRANDO make clear their desire to be in open communication with the care team about baby's quality of life she he be born prematurely.      Identified Concerns:  No psychosocial concerns identified in this NICU consultation.      PLAN:     SW will continue to be available to family throughout Cinthia's pregnancy for supportive interventions.      EMRE Jin [facility]  Clinical   Maternal Child Health  Voicemail:  [phone]  Reachable via [employer]

## 2025-06-17 ENCOUNTER — ANCILLARY PROCEDURE (OUTPATIENT)
Dept: ULTRASOUND IMAGING | Facility: HOSPITAL | Age: 40
End: 2025-06-17
Attending: OBSTETRICS & GYNECOLOGY
Payer: COMMERCIAL

## 2025-06-17 ENCOUNTER — OFFICE VISIT (OUTPATIENT)
Dept: MATERNAL FETAL MEDICINE | Facility: HOSPITAL | Age: 40
End: 2025-06-17
Attending: OBSTETRICS & GYNECOLOGY
Payer: COMMERCIAL

## 2025-06-17 DIAGNOSIS — O26.879 SHORT CERVIX AFFECTING PREGNANCY: ICD-10-CM

## 2025-06-17 DIAGNOSIS — O34.32 CERVICAL INSUFFICIENCY DURING PREGNANCY IN SECOND TRIMESTER, ANTEPARTUM: Primary | ICD-10-CM

## 2025-06-17 DIAGNOSIS — O09.522 MULTIGRAVIDA OF ADVANCED MATERNAL AGE IN SECOND TRIMESTER: ICD-10-CM

## 2025-06-17 PROCEDURE — 76815 OB US LIMITED FETUS(S): CPT

## 2025-06-17 NOTE — PROGRESS NOTES
"Please see \"Imaging\" tab under \"Chart Review\" for details of today's visit.    Samuel Acuna    "

## 2025-06-17 NOTE — NURSING NOTE
Cinthia Casillas is a  at 23w5d who presents to [facility] for scheduled weekly cerclage surveillance. Pt reports positive fetal movement. Pt denies bldg/lof/change in discharge, contractions, headache, vision changes, chest pain/SOB or edema. SBAR given to Dr. Acuna, see note in Epic.

## 2025-06-20 ENCOUNTER — TRANSFERRED RECORDS (OUTPATIENT)
Dept: HEALTH INFORMATION MANAGEMENT | Facility: CLINIC | Age: 40
End: 2025-06-20
Payer: COMMERCIAL

## 2025-06-20 NOTE — PROGRESS NOTES
Dear Colleagues:    I had the pleasure of meeting Cinthia Casillas for prenatal counseling on 2025 in the MFM Clinic at [facility]. Also present was her partner BRANDO (father of fetus) and Sunshine Peña, maternal child health .     This virtual visit was completed through the [employer] platform with audio and video connection. It was done in the context of Ms. Casillas being pregnant at 23w4d PMA with concern for cervical shortening qualifying for cerclage placement at 20w5d PMA and the risk of  birth of the baby, a boy- no name being shared yet. The pregnancy has been otherwise uncomplicated, and no fetal anomalies have been noted.     I reviewed with Ms Casillas and her partner the likely hospital course that  infants of varying ages may have. Overall, this was focused on extremely  infants born 23-25 weeks, 26-28 weeks, 29-32 weeks, and 33-35 weeks. If NICU admission is required (all infants <35 weeks at [facility]), there will be a delivery team present to resuscitate their infant, after which he would be admitted to the  Intensive Care Unit at the [facility]. I reviewed with her the need for close monitoring of respiratory status, and that if indicated, he will be intubated and provided mechanical ventilation support and surfactant replacement therapy. At this time there is no indication nor plan for betamethasone, but we discussed the importance of this therapy if indicated/recommended. We discussed the variable lung disease course and chronic lung disease as a potential complication of  birth along with its range in severity and decreasing incidence with increasing gestational age.    I reviewed the likely need for support with thermal support in warmer bed or isolette, the needs for IV nutrition and the plan for making the transition from IV nutrition to oral feedings. Ms. Casillas does plan to  breast feed, and we will plan to transition their baby over from IV nutrition to full enteral nutrition and work on oral feedings as their gestational age and weight allow. We reviewed feedings, in that the ability to orally feed typically happens around 34 weeks' gestational age, and that transition typically takes longer the earlier an infant is born.    I reviewed that head ultrasounds will likely be performed to evaluate for intracranial hemorrhage and known complications of infants born prematurely, and the decreasing incidence with increasing gestational age. I also reviewed with her the possible need for screening for retinopathy of prematurity depending on gestational age, weight and degree of respiratory support needs after birth. Additionally, the increased risk for infectious possibilities in premature newborns while in the hospital was also reviewed. Finally, we broadly discussed expected hospital course/discharge criteria and possible timing based on different gestational ages.     I discussed the overall makeup of the medical team and healthcare providers in the  Intensive Care Unit.  I described the typical structure of rounds with and invited them to be present during rounds and assured them that there will always be someone available to answer any questions.      I answered their questions to the best of my ability and assured them we are able to follow-up with another visit if desired or if she is admitted to the hospital.    Thank you very much for the opportunity to meet this couple, and we look forward to caring for them at [facility], whether in the [facility] or  Intensive Care Unit.    Time on this virtual visit was 34:05 minutes, of which 100% was spent in counseling.     Sincerely,  Suly Gross MD  Attending Neonatologist  [facility] NICU

## 2025-06-24 ENCOUNTER — OFFICE VISIT (OUTPATIENT)
Dept: MATERNAL FETAL MEDICINE | Facility: HOSPITAL | Age: 40
End: 2025-06-24
Attending: OBSTETRICS & GYNECOLOGY
Payer: COMMERCIAL

## 2025-06-24 ENCOUNTER — ANCILLARY PROCEDURE (OUTPATIENT)
Dept: ULTRASOUND IMAGING | Facility: HOSPITAL | Age: 40
End: 2025-06-24
Attending: OBSTETRICS & GYNECOLOGY
Payer: COMMERCIAL

## 2025-06-24 DIAGNOSIS — O26.879 SHORT CERVIX AFFECTING PREGNANCY: ICD-10-CM

## 2025-06-24 DIAGNOSIS — O09.522 MULTIGRAVIDA OF ADVANCED MATERNAL AGE IN SECOND TRIMESTER: ICD-10-CM

## 2025-06-24 DIAGNOSIS — O34.32 CERVICAL INSUFFICIENCY DURING PREGNANCY IN SECOND TRIMESTER, ANTEPARTUM: Primary | ICD-10-CM

## 2025-06-24 PROCEDURE — 76815 OB US LIMITED FETUS(S): CPT | Mod: 26 | Performed by: OBSTETRICS & GYNECOLOGY

## 2025-06-24 PROCEDURE — 99207 PR NO CHARGE LOS: CPT | Performed by: OBSTETRICS & GYNECOLOGY

## 2025-06-24 PROCEDURE — 76815 OB US LIMITED FETUS(S): CPT

## 2025-06-24 NOTE — NURSING NOTE
Cinthia Casillas is a  at 25w3d who presents to [facility] for transabdominal cervical length assessment. Pt reports positive fetal movement. Pt denies bldg/lof/change in discharge, contractions, headache, vision changes, chest pain/SOB or edema. SBAR given to Dr. Acuna, see note in Epic.

## 2025-07-02 ENCOUNTER — HOSPITAL ENCOUNTER (OUTPATIENT)
Dept: ULTRASOUND IMAGING | Facility: CLINIC | Age: 40
Discharge: HOME | End: 2025-07-02
Attending: OBSTETRICS & GYNECOLOGY | Admitting: OBSTETRICS & GYNECOLOGY
Payer: COMMERCIAL

## 2025-07-02 ENCOUNTER — OFFICE VISIT (OUTPATIENT)
Dept: MATERNAL FETAL MEDICINE | Facility: CLINIC | Age: 40
End: 2025-07-02
Attending: OBSTETRICS & GYNECOLOGY
Payer: COMMERCIAL

## 2025-07-02 DIAGNOSIS — O34.32 CERVICAL INSUFFICIENCY DURING PREGNANCY IN SECOND TRIMESTER, ANTEPARTUM: Primary | ICD-10-CM

## 2025-07-02 DIAGNOSIS — O09.522 MULTIGRAVIDA OF ADVANCED MATERNAL AGE IN SECOND TRIMESTER: ICD-10-CM

## 2025-07-02 DIAGNOSIS — O26.879 SHORT CERVIX AFFECTING PREGNANCY: ICD-10-CM

## 2025-07-02 PROCEDURE — 76815 OB US LIMITED FETUS(S): CPT

## 2025-07-02 NOTE — NURSING NOTE
Patient reports positive fetal movement, no pain, no contractions, leaking of fluid, or bleeding. Education provided to patient on today's ultrasound.  SBAR given to RANJITH CHANG, see their note in Epic.

## 2025-07-03 ENCOUNTER — TRANSFERRED RECORDS (OUTPATIENT)
Dept: HEALTH INFORMATION MANAGEMENT | Facility: CLINIC | Age: 40
End: 2025-07-03
Payer: COMMERCIAL

## 2025-07-11 ENCOUNTER — HOSPITAL ENCOUNTER (OUTPATIENT)
Dept: ULTRASOUND IMAGING | Facility: CLINIC | Age: 40
Discharge: HOME | End: 2025-07-11
Attending: OBSTETRICS & GYNECOLOGY | Admitting: OBSTETRICS & GYNECOLOGY
Payer: COMMERCIAL

## 2025-07-11 DIAGNOSIS — O26.879 SHORT CERVIX AFFECTING PREGNANCY: ICD-10-CM

## 2025-07-11 DIAGNOSIS — O09.522 MULTIGRAVIDA OF ADVANCED MATERNAL AGE IN SECOND TRIMESTER: ICD-10-CM

## 2025-07-11 PROCEDURE — 76816 OB US FOLLOW-UP PER FETUS: CPT

## 2025-07-16 ENCOUNTER — LAB REQUISITION (OUTPATIENT)
Dept: LAB | Facility: CLINIC | Age: 40
End: 2025-07-16

## 2025-07-16 ENCOUNTER — ANCILLARY PROCEDURE (OUTPATIENT)
Dept: ULTRASOUND IMAGING | Facility: HOSPITAL | Age: 40
End: 2025-07-16
Attending: STUDENT IN AN ORGANIZED HEALTH CARE EDUCATION/TRAINING PROGRAM
Payer: COMMERCIAL

## 2025-07-16 ENCOUNTER — OFFICE VISIT (OUTPATIENT)
Dept: MATERNAL FETAL MEDICINE | Facility: HOSPITAL | Age: 40
End: 2025-07-16
Attending: STUDENT IN AN ORGANIZED HEALTH CARE EDUCATION/TRAINING PROGRAM
Payer: COMMERCIAL

## 2025-07-16 DIAGNOSIS — O26.879 SHORT CERVIX AFFECTING PREGNANCY: Primary | ICD-10-CM

## 2025-07-16 DIAGNOSIS — O09.523 MULTIGRAVIDA OF ADVANCED MATERNAL AGE IN THIRD TRIMESTER: ICD-10-CM

## 2025-07-16 DIAGNOSIS — Z11.3 ENCOUNTER FOR SCREENING FOR INFECTIONS WITH A PREDOMINANTLY SEXUAL MODE OF TRANSMISSION: ICD-10-CM

## 2025-07-16 DIAGNOSIS — Z36.89 ENCOUNTER FOR OTHER SPECIFIED ANTENATAL SCREENING: ICD-10-CM

## 2025-07-16 DIAGNOSIS — O34.32 CERVICAL INSUFFICIENCY DURING PREGNANCY IN SECOND TRIMESTER, ANTEPARTUM: ICD-10-CM

## 2025-07-16 DIAGNOSIS — Z01.83 ENCOUNTER FOR BLOOD TYPING: ICD-10-CM

## 2025-07-16 DIAGNOSIS — O09.522 MULTIGRAVIDA OF ADVANCED MATERNAL AGE IN SECOND TRIMESTER: ICD-10-CM

## 2025-07-16 DIAGNOSIS — O26.879 SHORT CERVIX AFFECTING PREGNANCY: ICD-10-CM

## 2025-07-16 LAB
ERYTHROCYTE [DISTWIDTH] IN BLOOD BY AUTOMATED COUNT: 12.7 % (ref 10–15)
HCT VFR BLD AUTO: 39.1 % (ref 35–47)
HGB BLD-MCNC: 13.2 G/DL (ref 11.7–15.7)
MCH RBC QN AUTO: 31.5 PG (ref 26.5–33)
MCHC RBC AUTO-ENTMCNC: 33.8 G/DL (ref 31.5–36.5)
MCV RBC AUTO: 93 FL (ref 78–100)
PLATELET # BLD AUTO: 196 10E3/UL (ref 150–450)
RBC # BLD AUTO: 4.19 10E6/UL (ref 3.8–5.2)
WBC # BLD AUTO: 9.2 10E3/UL (ref 4–11)

## 2025-07-16 PROCEDURE — 86780 TREPONEMA PALLIDUM: CPT

## 2025-07-16 PROCEDURE — 85014 HEMATOCRIT: CPT

## 2025-07-16 PROCEDURE — 76815 OB US LIMITED FETUS(S): CPT

## 2025-07-16 PROCEDURE — 86850 RBC ANTIBODY SCREEN: CPT

## 2025-07-16 PROCEDURE — 76815 OB US LIMITED FETUS(S): CPT | Mod: 26 | Performed by: STUDENT IN AN ORGANIZED HEALTH CARE EDUCATION/TRAINING PROGRAM

## 2025-07-16 NOTE — NURSING NOTE
Cinthia Casillas is a  at 28w4d who presents to [facility] for cervical length evaluation. Pt reports positive fetal movement. Pt denies bldg/lof/change in discharge, or contractions. SBAR given to Dr. Gallardo, see note in Epic.

## 2025-07-16 NOTE — PROGRESS NOTES
"Please see \"Imaging\" tab under \"Chart Review\" for details of today's visit.    Carolina Gallardo    "

## 2025-07-17 LAB
BLD GP AB SCN SERPL QL: NEGATIVE
SPECIMEN EXP DATE BLD: NORMAL
T PALLIDUM AB SER QL: NONREACTIVE

## (undated) DEVICE — LUBRICATING JELLY 2.7GM T00137

## (undated) DEVICE — SU ETHILON 2 CT-2 30" D-6865

## (undated) DEVICE — PREP CHLORHEXIDINE 4% 32OZ

## (undated) DEVICE — GLOVE PROTEXIS BLUE W/NEU-THERA 6.0  2D73EB60

## (undated) DEVICE — SOL WATER IRRIG 1000ML BOTTLE 07139-09

## (undated) DEVICE — SPONGE RAY-TEC 4X8" 7318

## (undated) DEVICE — Device

## (undated) DEVICE — PREP SKIN SCRUB TRAY 4461A

## (undated) DEVICE — GLOVE ESTEEM POWDER FREE SMT 6.0  2D72PT60